# Patient Record
Sex: FEMALE | Race: WHITE | NOT HISPANIC OR LATINO | Employment: OTHER | URBAN - NONMETROPOLITAN AREA
[De-identification: names, ages, dates, MRNs, and addresses within clinical notes are randomized per-mention and may not be internally consistent; named-entity substitution may affect disease eponyms.]

---

## 2021-09-20 ENCOUNTER — TELEPHONE (OUTPATIENT)
Dept: CARDIOLOGY | Facility: CLINIC | Age: 61
End: 2021-09-20

## 2021-09-20 ENCOUNTER — OFFICE VISIT (OUTPATIENT)
Dept: CARDIOLOGY | Facility: CLINIC | Age: 61
End: 2021-09-20

## 2021-09-20 ENCOUNTER — LAB (OUTPATIENT)
Dept: CARDIOLOGY | Facility: CLINIC | Age: 61
End: 2021-09-20

## 2021-09-20 VITALS
HEART RATE: 80 BPM | TEMPERATURE: 97.4 F | WEIGHT: 154 LBS | DIASTOLIC BLOOD PRESSURE: 74 MMHG | OXYGEN SATURATION: 98 % | BODY MASS INDEX: 28.34 KG/M2 | HEIGHT: 62 IN | SYSTOLIC BLOOD PRESSURE: 124 MMHG

## 2021-09-20 DIAGNOSIS — R60.9 PERIPHERAL EDEMA: ICD-10-CM

## 2021-09-20 DIAGNOSIS — R26.89 IMBALANCE: ICD-10-CM

## 2021-09-20 DIAGNOSIS — R20.2 NUMBNESS AND TINGLING IN LEFT ARM: ICD-10-CM

## 2021-09-20 DIAGNOSIS — G45.8 SUBCLAVIAN STEAL SYNDROME OF LEFT SUBCLAVIAN ARTERY: ICD-10-CM

## 2021-09-20 DIAGNOSIS — R94.31 EKG ABNORMALITIES: Primary | ICD-10-CM

## 2021-09-20 DIAGNOSIS — R20.0 NUMBNESS AND TINGLING IN LEFT ARM: ICD-10-CM

## 2021-09-20 DIAGNOSIS — F17.200 SMOKING: ICD-10-CM

## 2021-09-20 DIAGNOSIS — R06.02 SHORTNESS OF BREATH: ICD-10-CM

## 2021-09-20 DIAGNOSIS — R09.89 BILATERAL CAROTID BRUITS: ICD-10-CM

## 2021-09-20 PROBLEM — F32.A ANXIETY AND DEPRESSION: Status: ACTIVE | Noted: 2021-09-20

## 2021-09-20 PROBLEM — F41.9 ANXIETY AND DEPRESSION: Status: ACTIVE | Noted: 2021-09-20

## 2021-09-20 PROBLEM — M54.2 NECK PAIN: Status: ACTIVE | Noted: 2021-09-20

## 2021-09-20 PROBLEM — J30.2 SEASONAL ALLERGIES: Status: ACTIVE | Noted: 2021-09-20

## 2021-09-20 PROBLEM — J44.9 COPD (CHRONIC OBSTRUCTIVE PULMONARY DISEASE): Status: ACTIVE | Noted: 2021-09-20

## 2021-09-20 LAB
ALBUMIN SERPL-MCNC: 4.08 G/DL (ref 3.5–5.2)
ALBUMIN/GLOB SERPL: 1.3 G/DL
ALP SERPL-CCNC: 103 U/L (ref 39–117)
ALT SERPL W P-5'-P-CCNC: 12 U/L (ref 1–33)
ANION GAP SERPL CALCULATED.3IONS-SCNC: 10.7 MMOL/L (ref 5–15)
AST SERPL-CCNC: 24 U/L (ref 1–32)
BASOPHILS # BLD AUTO: 0.1 10*3/MM3 (ref 0–0.2)
BASOPHILS NFR BLD AUTO: 1.1 % (ref 0–1.5)
BILIRUB SERPL-MCNC: 0.5 MG/DL (ref 0–1.2)
BUN SERPL-MCNC: 10 MG/DL (ref 8–23)
BUN/CREAT SERPL: 18.9 (ref 7–25)
CALCIUM SPEC-SCNC: 9.3 MG/DL (ref 8.6–10.5)
CHLORIDE SERPL-SCNC: 99 MMOL/L (ref 98–107)
CHOLEST SERPL-MCNC: 223 MG/DL (ref 0–200)
CO2 SERPL-SCNC: 25.3 MMOL/L (ref 22–29)
CREAT SERPL-MCNC: 0.53 MG/DL (ref 0.57–1)
DEPRECATED RDW RBC AUTO: 43.2 FL (ref 37–54)
EOSINOPHIL # BLD AUTO: 0.35 10*3/MM3 (ref 0–0.4)
EOSINOPHIL NFR BLD AUTO: 3.8 % (ref 0.3–6.2)
ERYTHROCYTE [DISTWIDTH] IN BLOOD BY AUTOMATED COUNT: 12.8 % (ref 12.3–15.4)
GFR SERPL CREATININE-BSD FRML MDRD: 117 ML/MIN/1.73
GLOBULIN UR ELPH-MCNC: 3.2 GM/DL
GLUCOSE SERPL-MCNC: 98 MG/DL (ref 65–99)
HCT VFR BLD AUTO: 43 % (ref 34–46.6)
HDLC SERPL-MCNC: 49 MG/DL (ref 40–60)
HGB BLD-MCNC: 13.9 G/DL (ref 12–15.9)
IMM GRANULOCYTES # BLD AUTO: 0.03 10*3/MM3 (ref 0–0.05)
IMM GRANULOCYTES NFR BLD AUTO: 0.3 % (ref 0–0.5)
LDLC SERPL CALC-MCNC: 150 MG/DL (ref 0–100)
LDLC/HDLC SERPL: 3.02 {RATIO}
LYMPHOCYTES # BLD AUTO: 3.54 10*3/MM3 (ref 0.7–3.1)
LYMPHOCYTES NFR BLD AUTO: 38.7 % (ref 19.6–45.3)
MCH RBC QN AUTO: 29.5 PG (ref 26.6–33)
MCHC RBC AUTO-ENTMCNC: 32.3 G/DL (ref 31.5–35.7)
MCV RBC AUTO: 91.3 FL (ref 79–97)
MONOCYTES # BLD AUTO: 0.61 10*3/MM3 (ref 0.1–0.9)
MONOCYTES NFR BLD AUTO: 6.7 % (ref 5–12)
NEUTROPHILS NFR BLD AUTO: 4.51 10*3/MM3 (ref 1.7–7)
NEUTROPHILS NFR BLD AUTO: 49.4 % (ref 42.7–76)
NRBC BLD AUTO-RTO: 0 /100 WBC (ref 0–0.2)
PLATELET # BLD AUTO: 283 10*3/MM3 (ref 140–450)
PMV BLD AUTO: 10.4 FL (ref 6–12)
POTASSIUM SERPL-SCNC: 3.9 MMOL/L (ref 3.5–5.2)
PROT SERPL-MCNC: 7.3 G/DL (ref 6–8.5)
RBC # BLD AUTO: 4.71 10*6/MM3 (ref 3.77–5.28)
SODIUM SERPL-SCNC: 135 MMOL/L (ref 136–145)
TRIGL SERPL-MCNC: 131 MG/DL (ref 0–150)
TSH SERPL DL<=0.05 MIU/L-ACNC: 1.25 UIU/ML (ref 0.27–4.2)
VLDLC SERPL-MCNC: 24 MG/DL (ref 5–40)
WBC # BLD AUTO: 9.14 10*3/MM3 (ref 3.4–10.8)

## 2021-09-20 PROCEDURE — 80050 GENERAL HEALTH PANEL: CPT | Performed by: NURSE PRACTITIONER

## 2021-09-20 PROCEDURE — 99204 OFFICE O/P NEW MOD 45 MIN: CPT | Performed by: NURSE PRACTITIONER

## 2021-09-20 PROCEDURE — 93000 ELECTROCARDIOGRAM COMPLETE: CPT | Performed by: NURSE PRACTITIONER

## 2021-09-20 PROCEDURE — 80061 LIPID PANEL: CPT | Performed by: NURSE PRACTITIONER

## 2021-09-20 PROCEDURE — 36415 COLL VENOUS BLD VENIPUNCTURE: CPT

## 2021-09-20 RX ORDER — ASPIRIN 81 MG/1
81 TABLET ORAL DAILY
Qty: 90 TABLET | Refills: 3 | Status: SHIPPED | OUTPATIENT
Start: 2021-09-20

## 2021-09-20 RX ORDER — ALENDRONATE SODIUM 70 MG/1
70 TABLET ORAL
COMMUNITY

## 2021-09-20 RX ORDER — RALOXIFENE HYDROCHLORIDE 60 MG/1
60 TABLET, FILM COATED ORAL DAILY
COMMUNITY
End: 2021-09-20

## 2021-09-20 RX ORDER — UBIDECARENONE 75 MG
50 CAPSULE ORAL DAILY
COMMUNITY

## 2021-09-20 NOTE — PROGRESS NOTES
Subjective   Blaire Mena is a 60 y.o. female     Chief Complaint   Patient presents with   [facility]    Problem List:    1.  Subclavian steal syndrome left subclavian artery  2.  Bilateral carotid bruits  3.  Shortness of breath  4.  COPD  5.  Neck pain  6.  Anxiety and depression  7.  Smoking habituation    Patient is a 60-year-old female who presents today for cardiac clearance for neck surgery.  Patient says she has imbalance and she also has numbness in her left arm and hand.  She said that she had a nerve conduction study test that was positive for nerve damage coming from her neck.  She denies any chest pain, pressure, palpitations, fluttering, dizziness, presyncope, syncope, orthopnea or PND.  Patient says that again she has numbness in her left arm.  She will get weakness in her left leg.  She says she does get swelling in her ankles when she is on them more, but goes down overnight. She says she has shortness of breath with increased activity however due to her imbalance and her weakness she has not really been very active at all. She says her family does a lot for her. She did see a cardiologist in [place] in the past, Dr. Ramírez. We will request those records. She says she saw him before because of swelling.    Occupation: Disabled due to left lower extremity injury requiring surgery and plates  Patient smokes 2 pack a day for 40+ years; no alcohol or illicit drugs  She denies soda; drinks 3 cups of coffee per day; occasional glass of tea    Mom 83 -CHF, diabetes  Dad -brain embolism, heart problems, smoker  Brother late 60s alive-hypertension  Sister 71 alive-hypertension    Patient had 27 mmHg difference between left and right arm in blood pressure readings today. Patient also had carotid bruits and some nonspecific EKG changes. Patient is going to definitely need a work-up prior to us clearing her for surgery on Monday. I have requested her testing to be done as  soon as possible.    Current Outpatient Medications on File Prior to Visit   Medication Sig Dispense Refill   • alendronate (FOSAMAX) 70 MG tablet Take 70 mg by mouth Every 7 (Seven) Days.     • vitamin B-12 (CYANOCOBALAMIN) 100 MCG tablet Take 50 mcg by mouth Daily.     • [DISCONTINUED] raloxifene (EVISTA) 60 MG tablet Take 60 mg by mouth Daily.       No current facility-administered medications on file prior to visit.       ALLERGIES    Sulfa antibiotics    Past Medical History:   Diagnosis Date   • Arthritis    • Asthma    • COPD (chronic obstructive pulmonary disease) (CMS/[facility])        Social History     Socioeconomic History   • Marital status:      Spouse name: Not on file   • Number of children: Not on file   • Years of education: Not on file   • Highest education level: Not on file   Tobacco Use   • Smoking status: Current Every Day Smoker     Packs/day: 2.50     Years: 41.00     Pack years: 102.50     Types: Cigarettes   • Smokeless tobacco: Never Used   Substance and Sexual Activity   • Alcohol use: Never   • Drug use: Defer   • Sexual activity: Defer       Family History   Problem Relation Age of Onset   • Diabetes Mother    • Heart disease Mother    • Hypertension Mother    • Hyperlipidemia Mother    • Hypertension Father    • Heart disease Father    • Hyperlipidemia Father    • Aneurysm Father        Review of Systems   Constitutional: Negative for appetite change, chills, diaphoresis, fatigue and fever.   HENT: Positive for congestion (chest ). Negative for rhinorrhea and sore throat.    Eyes: Positive for visual disturbance (reading glasses).   Respiratory: Positive for cough (Clear and yellow phlem ) and shortness of breath (right now due to bronchitis otherwise, limited due to physical problems ). Negative for chest tightness and wheezing.    Cardiovascular: Positive for leg swelling (ankle ( left) the swelling goes down at night; when she is on them more ). Negative for chest pain and  "palpitations.   Gastrointestinal: Positive for diarrhea (due to taking meds for infection ). Negative for abdominal pain, blood in stool, constipation, nausea and vomiting.   Endocrine: Negative for cold intolerance and heat intolerance.   Genitourinary: Negative for difficulty urinating, dysuria, frequency, hematuria and urgency.   Musculoskeletal: Positive for arthralgias (left leg ), gait problem (due to her back and leg), neck pain (back of her neck ) and neck stiffness (stiffiness turning to the left ). Negative for back pain and joint swelling.   Skin: Negative for color change, pallor, rash and wound.   Allergic/Immunologic: Negative for environmental allergies and food allergies.   Neurological: Positive for weakness (left leg ), numbness (arms and hands ( left hand is the worse)  ) and headaches (due toneck pain ). Negative for dizziness and light-headedness.   Psychiatric/Behavioral: Negative for sleep disturbance.       Objective   /74 (BP Location: Right arm, Patient Position: Sitting)   Pulse 80   Temp 97.4 °F (36.3 °C)   Ht 157.5 cm (62\")   Wt 69.9 kg (154 lb)   SpO2 98%   BMI 28.17 kg/m²   Vitals:    09/20/21 1606 09/20/21 1655 09/20/21 1656   BP: 145/75 97/68 124/74   BP Location: Left arm Left arm Right arm   Patient Position:  Sitting Sitting   Pulse: 80     Temp: 97.4 °F (36.3 °C)     SpO2: 98%     Weight: 69.9 kg (154 lb)     Height: 157.5 cm (62\")        Lab Results (most recent)     None        Physical Exam  Vitals reviewed.   Constitutional:       General: She is awake.      Appearance: Normal appearance. She is well-developed, well-groomed and overweight.   HENT:      Head: Normocephalic.   Eyes:      General: Lids are normal.      Comments: Wears glasses    Neck:      Vascular: Carotid bruit (bilateral ) present.   Cardiovascular:      Rate and Rhythm: Normal rate and regular rhythm.      Pulses:           Radial pulses are 2+ on the right side.        Dorsalis pedis pulses are " 2+ on the right side and 2+ on the left side.        Posterior tibial pulses are 2+ on the right side and 2+ on the left side.      Heart sounds: Normal heart sounds.      Comments: Decreased pulse L radial artery   Pulmonary:      Effort: Pulmonary effort is normal.      Breath sounds: Normal air entry. Examination of the right-lower field reveals decreased breath sounds. Examination of the left-lower field reveals decreased breath sounds. Decreased breath sounds present.   Abdominal:      General: Bowel sounds are normal.      Palpations: Abdomen is soft.   Musculoskeletal:      Cervical back: Signs of trauma present.      Right lower leg: No edema.      Left lower leg: No edema.   Skin:     General: Skin is warm and dry.   Neurological:      Mental Status: She is alert and oriented to person, place, and time.   Psychiatric:         Attention and Perception: Attention and perception normal.         Mood and Affect: Mood and affect normal.         Speech: Speech normal.         Behavior: Behavior normal. Behavior is cooperative.         Thought Content: Thought content normal.         Cognition and Memory: Cognition and memory normal.         Judgment: Judgment normal.         Procedure     ECG 12 Lead    Date/Time: 9/20/2021 5:43 PM  Performed by: Yi Dela Cruz APRN  Authorized by: Yi Dela Cruz APRN   Comparison: not compared with previous ECG   Rhythm: sinus rhythm  Rate: normal  BPM: 73  T inversion: III and aVF  QRS axis: normal  Other findings: low voltage and T wave abnormality    Clinical impression: abnormal EKG                 Assessment/Plan      Diagnosis Plan   1. EKG abnormalities  Stress Test With Myocardial Perfusion One Day   2. Peripheral edema  Adult Transthoracic Echo Complete W/ Cont if Necessary Per Protocol   3. Subclavian steal syndrome of left subclavian artery  US Carotid Bilateral   4. Numbness and tingling in left arm  US Carotid Bilateral   5. Smoking  Adult Transthoracic Echo  Complete W/ Cont if Necessary Per Protocol    Stress Test With Myocardial Perfusion One Day   6. Shortness of breath  Adult Transthoracic Echo Complete W/ Cont if Necessary Per Protocol    Stress Test With Myocardial Perfusion One Day   7. Bilateral carotid bruits  US Carotid Bilateral    aspirin (aspirin) 81 MG EC tablet    Comprehensive Metabolic Panel    Lipid Panel    CBC & Differential    TSH   8. Imbalance         Return in about 12 weeks (around 12/13/2021).    EKG abnormalities/peripheral edema/smoking/shortness of breath-patient have an ischemia work-up, stress and echo. Patient is unable to walk on the treadmill due to her imbalance as well as her left lower extremity weakness. She has had multiple surgeries on that leg. She will start aspirin 81. I am getting blood work today and then we will try to get her on cholesterol medicine. She will continue her medication regimen otherwise. Subclavian steal syndrome left subclavian artery/numbness and tingling left arm/bilateral carotid bruits-patient have carotid artery ultrasound. She will get CMP, lipid, CBC and TSH today. She will follow-up in 12 weeks or sooner if any changes or abnormalities with testing.       Blaire Mena  reports that she has been smoking cigarettes. She has a 102.50 pack-year smoking history. She has never used smokeless tobacco.. Patient did not bring med list or medicine bottles to appointment, med list has been reviewed and updated based on patient's knowledge of their meds.   Electronically signed by:

## 2021-09-20 NOTE — TELEPHONE ENCOUNTER
CARDIAC CLEARANCE RECEIVED THIS DATE, SCANNED, AND PLACED IN CLINICAL STAFF'S MAILBOX.    [facility] NEURO  DR. TAL EASTMAN 34,4-5,5-6  09/27/2021

## 2021-09-21 ENCOUNTER — TELEPHONE (OUTPATIENT)
Dept: CARDIOLOGY | Facility: CLINIC | Age: 61
End: 2021-09-21

## 2021-09-21 DIAGNOSIS — R60.9 PERIPHERAL EDEMA: ICD-10-CM

## 2021-09-21 DIAGNOSIS — R20.0 NUMBNESS AND TINGLING IN LEFT ARM: ICD-10-CM

## 2021-09-21 DIAGNOSIS — F17.200 SMOKING: ICD-10-CM

## 2021-09-21 DIAGNOSIS — M54.2 NECK PAIN: ICD-10-CM

## 2021-09-21 DIAGNOSIS — R94.31 EKG ABNORMALITIES: ICD-10-CM

## 2021-09-21 DIAGNOSIS — R20.2 NUMBNESS AND TINGLING IN LEFT ARM: ICD-10-CM

## 2021-09-21 DIAGNOSIS — R09.89 BILATERAL CAROTID BRUITS: Primary | ICD-10-CM

## 2021-09-21 DIAGNOSIS — R06.02 SHORTNESS OF BREATH: ICD-10-CM

## 2021-09-21 DIAGNOSIS — J44.9 CHRONIC OBSTRUCTIVE PULMONARY DISEASE, UNSPECIFIED COPD TYPE (HCC): ICD-10-CM

## 2021-09-21 RX ORDER — ATORVASTATIN CALCIUM 40 MG/1
40 TABLET, FILM COATED ORAL NIGHTLY
Qty: 30 TABLET | Refills: 11 | Status: SHIPPED | OUTPATIENT
Start: 2021-09-21 | End: 2022-02-11 | Stop reason: ALTCHOICE

## 2021-09-21 NOTE — TELEPHONE ENCOUNTER
----- Message from SHARYN Shoemaker sent at 9/21/2021  6:15 AM EDT -----  Please advise patient.  Want her to start statin.  LDL is 150.  Have her start atorvastatin 40 mg PO nightly and repeat cmp/lipids in 6 weeks.           Pt was advised of lab results and she will repeat labs in 6 weeks ,pt will  lab orders tomorrow while she is here for testing .          TSH   0.270 - 4.200 uIU/mL 1.250      Creatinine   0.57 - 1.00 mg/dL 0.53Low     Sodium   136 - 145 mmol/L 135Low     Potassium   3.5 - 5.2 mmol/L 3.9      ALT (SGPT)   1 - 33 U/L 12    AST (SGOT)   1 - 32 U/L 24      Total Cholesterol   0 - 200 mg/dL 223High     Triglycerides   0 - 150 mg/dL 131    HDL Cholesterol   40 - 60 mg/dL 49    LDL Cholesterol    0 - 100 mg/dL 150High

## 2021-09-22 ENCOUNTER — HOSPITAL ENCOUNTER (OUTPATIENT)
Dept: CARDIOLOGY | Facility: HOSPITAL | Age: 61
Discharge: HOME | End: 2021-09-22

## 2021-09-22 VITALS — HEIGHT: 62 IN | WEIGHT: 154.1 LBS | BODY MASS INDEX: 28.36 KG/M2

## 2021-09-22 DIAGNOSIS — R20.2 NUMBNESS AND TINGLING IN LEFT ARM: ICD-10-CM

## 2021-09-22 DIAGNOSIS — R09.89 BILATERAL CAROTID BRUITS: ICD-10-CM

## 2021-09-22 DIAGNOSIS — R60.9 PERIPHERAL EDEMA: ICD-10-CM

## 2021-09-22 DIAGNOSIS — G45.8 SUBCLAVIAN STEAL SYNDROME OF LEFT SUBCLAVIAN ARTERY: ICD-10-CM

## 2021-09-22 DIAGNOSIS — R94.31 EKG ABNORMALITIES: ICD-10-CM

## 2021-09-22 DIAGNOSIS — R20.0 NUMBNESS AND TINGLING IN LEFT ARM: ICD-10-CM

## 2021-09-22 DIAGNOSIS — F17.200 SMOKING: ICD-10-CM

## 2021-09-22 DIAGNOSIS — R06.02 SHORTNESS OF BREATH: ICD-10-CM

## 2021-09-22 PROCEDURE — A9500 TC99M SESTAMIBI: HCPCS | Performed by: INTERNAL MEDICINE

## 2021-09-22 PROCEDURE — 93880 EXTRACRANIAL BILAT STUDY: CPT | Performed by: INTERNAL MEDICINE

## 2021-09-22 PROCEDURE — 93017 CV STRESS TEST TRACING ONLY: CPT

## 2021-09-22 PROCEDURE — 93306 TTE W/DOPPLER COMPLETE: CPT | Performed by: SPECIALIST

## 2021-09-22 PROCEDURE — 0 TECHNETIUM SESTAMIBI: Performed by: INTERNAL MEDICINE

## 2021-09-22 PROCEDURE — 78452 HT MUSCLE IMAGE SPECT MULT: CPT | Performed by: SPECIALIST

## 2021-09-22 PROCEDURE — 93880 EXTRACRANIAL BILAT STUDY: CPT

## 2021-09-22 PROCEDURE — 93018 CV STRESS TEST I&R ONLY: CPT | Performed by: SPECIALIST

## 2021-09-22 PROCEDURE — 25010000002 REGADENOSON 0.4 MG/5ML SOLUTION: Performed by: INTERNAL MEDICINE

## 2021-09-22 PROCEDURE — 78452 HT MUSCLE IMAGE SPECT MULT: CPT

## 2021-09-22 PROCEDURE — 93306 TTE W/DOPPLER COMPLETE: CPT

## 2021-09-22 RX ADMIN — TECHNETIUM TC 99M SESTAMIBI 1 DOSE: 1 INJECTION INTRAVENOUS at 11:01

## 2021-09-22 RX ADMIN — TECHNETIUM TC 99M SESTAMIBI 1 DOSE: 1 INJECTION INTRAVENOUS at 12:38

## 2021-09-22 RX ADMIN — REGADENOSON 0.4 MG: 0.08 INJECTION, SOLUTION INTRAVENOUS at 12:39

## 2021-09-24 ENCOUNTER — TELEPHONE (OUTPATIENT)
Dept: CARDIOLOGY | Facility: CLINIC | Age: 61
End: 2021-09-24

## 2021-09-24 LAB
AORTIC DIMENSIONLESS INDEX: 0.7 (DI)
BH CV ECHO MEAS - ACS: 2 CM
BH CV ECHO MEAS - AI DEC SLOPE: 222 CM/SEC^2
BH CV ECHO MEAS - AI MAX PG: 107.3 MMHG
BH CV ECHO MEAS - AI MAX VEL: 518 CM/SEC
BH CV ECHO MEAS - AI P1/2T: 683.4 MSEC
BH CV ECHO MEAS - AO MAX PG (FULL): 3.3 MMHG
BH CV ECHO MEAS - AO MAX PG: 6.5 MMHG
BH CV ECHO MEAS - AO MEAN PG (FULL): 3 MMHG
BH CV ECHO MEAS - AO MEAN PG: 4 MMHG
BH CV ECHO MEAS - AO ROOT AREA (BSA CORRECTED): 1.9
BH CV ECHO MEAS - AO ROOT AREA: 8.6 CM^2
BH CV ECHO MEAS - AO ROOT DIAM: 3.3 CM
BH CV ECHO MEAS - AO V2 MAX: 127 CM/SEC
BH CV ECHO MEAS - AO V2 MEAN: 90.2 CM/SEC
BH CV ECHO MEAS - AO V2 VTI: 30 CM
BH CV ECHO MEAS - BSA(HAYCOCK): 1.8 M^2
BH CV ECHO MEAS - BSA: 1.7 M^2
BH CV ECHO MEAS - BZI_BMI: 28.2 KILOGRAMS/M^2
BH CV ECHO MEAS - BZI_METRIC_HEIGHT: 157.5 CM
BH CV ECHO MEAS - BZI_METRIC_WEIGHT: 69.9 KG
BH CV ECHO MEAS - EDV(CUBED): 75.2 ML
BH CV ECHO MEAS - EDV(TEICH): 79.5 ML
BH CV ECHO MEAS - EF(CUBED): 71.4 %
BH CV ECHO MEAS - EF(MOD-BP): 63 %
BH CV ECHO MEAS - EF(TEICH): 63.5 %
BH CV ECHO MEAS - EF_3D-VOL: 56 %
BH CV ECHO MEAS - ESV(CUBED): 21.5 ML
BH CV ECHO MEAS - ESV(TEICH): 29 ML
BH CV ECHO MEAS - FS: 34.1 %
BH CV ECHO MEAS - IVS/LVPW: 1.2
BH CV ECHO MEAS - IVSD: 1.4 CM
BH CV ECHO MEAS - LA DIMENSION: 3.2 CM
BH CV ECHO MEAS - LA/AO: 0.95
BH CV ECHO MEAS - LAT PEAK E' VEL: 7.3 CM/SEC
BH CV ECHO MEAS - LV IVRT: 0.13 SEC
BH CV ECHO MEAS - LV MASS(C)D: 185 GRAMS
BH CV ECHO MEAS - LV MASS(C)DI: 108.1 GRAMS/M^2
BH CV ECHO MEAS - LV MAX PG: 3.2 MMHG
BH CV ECHO MEAS - LV MEAN PG: 1 MMHG
BH CV ECHO MEAS - LV V1 MAX: 89.4 CM/SEC
BH CV ECHO MEAS - LV V1 MEAN: 56.1 CM/SEC
BH CV ECHO MEAS - LV V1 VTI: 23.8 CM
BH CV ECHO MEAS - LVIDD: 4.2 CM
BH CV ECHO MEAS - LVIDS: 2.8 CM
BH CV ECHO MEAS - LVPWD: 1.1 CM
BH CV ECHO MEAS - MED PEAK E' VEL: 5.8 CM/SEC
BH CV ECHO MEAS - MR MAX PG: 94.1 MMHG
BH CV ECHO MEAS - MR MAX VEL: 485 CM/SEC
BH CV ECHO MEAS - MV A MAX VEL: 63.1 CM/SEC
BH CV ECHO MEAS - MV DEC SLOPE: 374 CM/SEC^2
BH CV ECHO MEAS - MV DEC TIME: 0.32 SEC
BH CV ECHO MEAS - MV E MAX VEL: 44.2 CM/SEC
BH CV ECHO MEAS - MV E/A: 0.7
BH CV ECHO MEAS - MV MAX PG: 2.6 MMHG
BH CV ECHO MEAS - MV MEAN PG: 1 MMHG
BH CV ECHO MEAS - MV P1/2T MAX VEL: 91.8 CM/SEC
BH CV ECHO MEAS - MV P1/2T: 71.9 MSEC
BH CV ECHO MEAS - MV V2 MAX: 80.3 CM/SEC
BH CV ECHO MEAS - MV V2 MEAN: 48.1 CM/SEC
BH CV ECHO MEAS - MV V2 VTI: 24.1 CM
BH CV ECHO MEAS - MVA P1/2T LCG: 2.4 CM^2
BH CV ECHO MEAS - MVA(P1/2T): 3.1 CM^2
BH CV ECHO MEAS - PA MAX PG: 2.6 MMHG
BH CV ECHO MEAS - PA MEAN PG: 2 MMHG
BH CV ECHO MEAS - PA V2 MAX: 80.3 CM/SEC
BH CV ECHO MEAS - PA V2 MEAN: 58.1 CM/SEC
BH CV ECHO MEAS - PA V2 VTI: 17.8 CM
BH CV ECHO MEAS - RAP SYSTOLE: 10 MMHG
BH CV ECHO MEAS - RVDD: 3.3 CM
BH CV ECHO MEAS - RVSP: 15.2 MMHG
BH CV ECHO MEAS - SI(AO): 150 ML/M^2
BH CV ECHO MEAS - SI(CUBED): 31.4 ML/M^2
BH CV ECHO MEAS - SI(TEICH): 29.5 ML/M^2
BH CV ECHO MEAS - SV(AO): 256.6 ML
BH CV ECHO MEAS - SV(CUBED): 53.7 ML
BH CV ECHO MEAS - SV(TEICH): 50.4 ML
BH CV ECHO MEAS - TR MAX VEL: 114 CM/SEC
BH CV ECHO MEASUREMENTS AVERAGE E/E' RATIO: 6.75
BH CV REST NUCLEAR ISOTOPE DOSE: 10 MCI
BH CV STRESS COMMENTS STAGE 1: NORMAL
BH CV STRESS DOSE REGADENOSON STAGE 1: 0.4
BH CV STRESS DURATION MIN STAGE 1: 0
BH CV STRESS DURATION SEC STAGE 1: 10
BH CV STRESS NUCLEAR ISOTOPE DOSE: 30 MCI
BH CV STRESS PROTOCOL 1: NORMAL
BH CV STRESS RECOVERY BP: NORMAL MMHG
BH CV STRESS RECOVERY HR: 70 BPM
BH CV STRESS STAGE 1: 1
LV EF NUC BP: 57 %
MAXIMAL PREDICTED HEART RATE: 158 BPM
MAXIMAL PREDICTED HEART RATE: 158 BPM
PERCENT MAX PREDICTED HR: 57.59 %
SINUS: 3.7 CM
STJ: 2.9 CM
STRESS BASELINE BP: NORMAL MMHG
STRESS BASELINE HR: 63 BPM
STRESS PERCENT HR: 68 %
STRESS POST PEAK BP: NORMAL MMHG
STRESS POST PEAK HR: 91 BPM
STRESS TARGET HR: 134 BPM
STRESS TARGET HR: 134 BPM

## 2021-09-24 NOTE — TELEPHONE ENCOUNTER
----- Message from SHARYN Shoemaker sent at 9/24/2021  9:43 AM EDT -----  Please advise patient.         Pt was advised of test results and that her Clearance was faxed     Echo -ejection fraction appears to be 61 - 65%    Stress test - Findings consistent with a normal ECG stress test.

## 2021-09-26 LAB
BH CV ECHO MEAS - BSA(HAYCOCK): 1.8 M^2
BH CV ECHO MEAS - BSA: 1.7 M^2
BH CV ECHO MEAS - BZI_BMI: 29.1 KILOGRAMS/M^2
BH CV ECHO MEAS - BZI_METRIC_HEIGHT: 154.9 CM
BH CV ECHO MEAS - BZI_METRIC_WEIGHT: 69.9 KG
BH CV XLRA MEAS LEFT BULB EDV: -21.6 CM/SEC
BH CV XLRA MEAS LEFT BULB PSV: -62.9 CM/SEC
BH CV XLRA MEAS LEFT CCA RATIO VEL: -79.6 CM/SEC
BH CV XLRA MEAS LEFT DIST CCA EDV: -16.1 CM/SEC
BH CV XLRA MEAS LEFT DIST CCA PSV: -80.3 CM/SEC
BH CV XLRA MEAS LEFT DIST ICA EDV: -30.8 CM/SEC
BH CV XLRA MEAS LEFT DIST ICA PSV: -99.3 CM/SEC
BH CV XLRA MEAS LEFT ICA RATIO VEL: -98.7 CM/SEC
BH CV XLRA MEAS LEFT ICA/CCA RATIO: 1.2
BH CV XLRA MEAS LEFT MID ICA EDV: -24.9 CM/SEC
BH CV XLRA MEAS LEFT MID ICA PSV: -77.7 CM/SEC
BH CV XLRA MEAS LEFT PROX CCA EDV: 28.9 CM/SEC
BH CV XLRA MEAS LEFT PROX CCA PSV: 113.1 CM/SEC
BH CV XLRA MEAS LEFT PROX ECA EDV: -18.2 CM/SEC
BH CV XLRA MEAS LEFT PROX ECA PSV: -100.6 CM/SEC
BH CV XLRA MEAS LEFT PROX ICA EDV: -22.3 CM/SEC
BH CV XLRA MEAS LEFT PROX ICA PSV: -82.4 CM/SEC
BH CV XLRA MEAS LEFT PROX SCLA EDV: 8.8 CM/SEC
BH CV XLRA MEAS LEFT PROX SCLA PSV: 79.1 CM/SEC
BH CV XLRA MEAS LEFT VERTEBRAL A EDV: 7.9 CM/SEC
BH CV XLRA MEAS LEFT VERTEBRAL A PSV: 21.1 CM/SEC
BH CV XLRA MEAS RIGHT BULB EDV: 2.2 CM/SEC
BH CV XLRA MEAS RIGHT BULB PSV: 39.2 CM/SEC
BH CV XLRA MEAS RIGHT CCA RATIO VEL: 71.1 CM/SEC
BH CV XLRA MEAS RIGHT DIST CCA EDV: 17.1 CM/SEC
BH CV XLRA MEAS RIGHT DIST CCA PSV: 71.7 CM/SEC
BH CV XLRA MEAS RIGHT DIST ICA EDV: -24.5 CM/SEC
BH CV XLRA MEAS RIGHT DIST ICA PSV: -71.9 CM/SEC
BH CV XLRA MEAS RIGHT ICA RATIO VEL: 81.2 CM/SEC
BH CV XLRA MEAS RIGHT ICA/CCA RATIO: 1.1
BH CV XLRA MEAS RIGHT MID ICA EDV: 24 CM/SEC
BH CV XLRA MEAS RIGHT MID ICA PSV: 81.6 CM/SEC
BH CV XLRA MEAS RIGHT PROX CCA EDV: -18.3 CM/SEC
BH CV XLRA MEAS RIGHT PROX CCA PSV: -148.4 CM/SEC
BH CV XLRA MEAS RIGHT PROX ECA EDV: -17.1 CM/SEC
BH CV XLRA MEAS RIGHT PROX ECA PSV: -95.9 CM/SEC
BH CV XLRA MEAS RIGHT PROX ICA EDV: -18.8 CM/SEC
BH CV XLRA MEAS RIGHT PROX ICA PSV: -63.3 CM/SEC
BH CV XLRA MEAS RIGHT PROX SCLA EDV: 1.7 CM/SEC
BH CV XLRA MEAS RIGHT PROX SCLA PSV: 115.2 CM/SEC
BH CV XLRA MEAS RIGHT VERTEBRAL A EDV: 27.7 CM/SEC
BH CV XLRA MEAS RIGHT VERTEBRAL A PSV: 72.3 CM/SEC
MAXIMAL PREDICTED HEART RATE: 158 BPM
STRESS TARGET HR: 134 BPM

## 2021-09-27 ENCOUNTER — TELEPHONE (OUTPATIENT)
Dept: CARDIOLOGY | Facility: CLINIC | Age: 61
End: 2021-09-27

## 2021-09-27 DIAGNOSIS — R93.89 ABNORMAL ULTRASOUND OF CAROTID ARTERY: ICD-10-CM

## 2021-09-27 DIAGNOSIS — R09.89 BILATERAL CAROTID BRUITS: ICD-10-CM

## 2021-09-27 DIAGNOSIS — F17.200 SMOKING: ICD-10-CM

## 2021-09-27 DIAGNOSIS — R06.02 SHORTNESS OF BREATH: ICD-10-CM

## 2021-09-27 DIAGNOSIS — M54.2 NECK PAIN: ICD-10-CM

## 2021-09-27 DIAGNOSIS — G45.8 SUBCLAVIAN STEAL SYNDROME OF LEFT SUBCLAVIAN ARTERY: Primary | ICD-10-CM

## 2021-09-27 NOTE — PROGRESS NOTES
Cta of neck, diag subclavian stenosis, vertebral artery stenosis and abnormal carotid artery US, will need a BMP order as well.

## 2021-09-27 NOTE — TELEPHONE ENCOUNTER
----- Message from SHARYN Shoemaker sent at 9/27/2021  7:04 AM EDT -----  Cta of neck, diag subclavian stenosis, vertebral artery stenosis and abnormal carotid artery US, will need a BMP order as well.              Left mess for pt regarding the above mess . PT will need to know results and to see if she is willing to do the CTA of the Neck .     Carotid U/S Results :    16 to 49% stenosis by Doppler in both internal carotid arteries.     4.  Antegrade flow in the right vertebral artery.  There is minimal flow in the left vertebral artery.  There also appears to be reversal of flow in the left subclavian.    CTA of the Neck ordered and Bmp ordered       AZUCENA Jones

## 2021-09-28 NOTE — TELEPHONE ENCOUNTER
Spoke with pt regarding results of us, per message.  ----- Message from HSARYN Shoemaker sent at 9/27/2021  7:04 AM EDT -----  Cta of neck, diag subclavian stenosis, vertebral artery stenosis and abnormal carotid artery US, will need a BMP order as well.                   Left mess for pt regarding the above mess . PT will need to know results and to see if she is willing to do the CTA of the Neck .      Carotid U/S Results :     16 to 49% stenosis by Doppler in both internal carotid arteries.     4.  Antegrade flow in the right vertebral artery.  There is minimal flow in the left vertebral artery.  There also appears to be reversal of flow in the left subclavian.     CTA of the Neck ordered and Bmp ordered         AZUCENA Jones                 Pt is okay with doing cta, and verbally understood she would be notified with blue and bmp is ordered before test, and she also understands.

## 2021-09-29 ENCOUNTER — TELEPHONE (OUTPATIENT)
Dept: CARDIOLOGY | Facility: CLINIC | Age: 61
End: 2021-09-29

## 2021-09-29 DIAGNOSIS — R06.02 SHORTNESS OF BREATH: Primary | ICD-10-CM

## 2021-09-29 DIAGNOSIS — F17.200 SMOKING: ICD-10-CM

## 2021-09-29 DIAGNOSIS — R09.89 BILATERAL CAROTID BRUITS: ICD-10-CM

## 2021-09-29 DIAGNOSIS — M54.2 NECK PAIN: ICD-10-CM

## 2021-09-29 DIAGNOSIS — G45.8 SUBCLAVIAN STEAL SYNDROME OF LEFT SUBCLAVIAN ARTERY: ICD-10-CM

## 2021-09-29 NOTE — TELEPHONE ENCOUNTER
----- Message from SHARYN Shoemaker sent at 9/27/2021  7:04 AM EDT -----  Cta of neck, diag subclavian stenosis, vertebral artery stenosis and abnormal carotid artery US, will need a BMP order as well.           Pt was advised of test results and that we would make her apt for CTA of the Neck . Pt was advised that someone would contact them with apt date and time .     Carotid U/S results :  16 to 49% stenosis         AZUCENA Jones

## 2021-10-22 ENCOUNTER — TELEPHONE (OUTPATIENT)
Dept: CARDIOLOGY | Facility: CLINIC | Age: 61
End: 2021-10-22

## 2021-10-22 DIAGNOSIS — I77.1 STENOSIS OF LEFT SUBCLAVIAN ARTERY (HCC): ICD-10-CM

## 2021-10-22 DIAGNOSIS — I65.02 STENOSIS OF LEFT VERTEBRAL ARTERY: Primary | ICD-10-CM

## 2021-10-22 NOTE — TELEPHONE ENCOUNTER
----- Message from Lakisha Natarajan LPN sent at 10/22/2021  8:46 AM EDT -----  Regarding: FW:    ----- Message -----  From: Yi Dela Cruz APRN  Sent: 10/21/2021   9:32 AM EDT  To: Lakisha Natarajan LPN  Subject: FW:                                              Left vertebral artery stenosis refer to Dr Ruiz.     Left subclavian artery stenosis refer to Dr Leiva.     She will need to get two copies of CT on CD to take with her to the appts.      Please confirm she is on ASA or plavix and a statin.  Thanks!  ----- Message -----  From: Ruth Mi, Valdez Rep  Sent: 10/21/2021   9:20 AM EDT  To: SHARYN Shoemaker

## 2021-10-22 NOTE — TELEPHONE ENCOUNTER
Entered orders and pended them regd pt's CTA neck results.       First attempt to reach pt. Left a voicemail for pt to return my call at [phone].

## 2021-10-25 NOTE — TELEPHONE ENCOUNTER
Pt called and was transferred to me. I informed her of her results and the message from Yi, she stated that she is still taking her medicines. She stated she wanted more info on the narrowing. I explained that her CTA neck showed narrowing in two arteries and that we need two different specialists to meet w/ her to discuss options. I advised her to P/U two copies of her CTa neck on a disc from [facility] for her appts and that both offices will call w/ appt dates and times for her. She verbalized understanding and understands to call w/ any questions.

## 2021-11-01 ENCOUNTER — OFFICE VISIT (OUTPATIENT)
Dept: CARDIAC SURGERY | Facility: CLINIC | Age: 61
End: 2021-11-01

## 2021-11-01 VITALS
HEART RATE: 88 BPM | OXYGEN SATURATION: 98 % | HEIGHT: 62 IN | DIASTOLIC BLOOD PRESSURE: 80 MMHG | SYSTOLIC BLOOD PRESSURE: 137 MMHG | BODY MASS INDEX: 28.71 KG/M2 | TEMPERATURE: 97.7 F | WEIGHT: 156 LBS

## 2021-11-01 DIAGNOSIS — I70.8 LEFT SUBCLAVIAN ARTERY OCCLUSION: Primary | ICD-10-CM

## 2021-11-01 PROCEDURE — 99204 OFFICE O/P NEW MOD 45 MIN: CPT | Performed by: THORACIC SURGERY (CARDIOTHORACIC VASCULAR SURGERY)

## 2021-11-01 NOTE — PROGRESS NOTES
11/01/2021  Patient Information  Blaire Mena                                                                                          [address] [facility] [address]   1960  'PCP/Referring Physician'  Sylvain Lujan MD  [phone]  Yi Dela Cruz APRN  [phone]  Chief Complaint   Patient presents with   • Consult     NP per SHARYN More for subclavian artery stenosis.       History of Present Illness:  This patient is a 61-year-old female who was referred to me to evaluate left arm numbness.  She states that for the last 3 to 4 months she has had weakness in the left arm and significant numbness in the fingers.  Subsequently, a CT angiogram demonstrated an occluded left subclavian artery.  In the office today we were unable to obtain an adequate blood pressure in the left arm with a noninvasive blood pressure cuff.  She does have chronic obstructive pulmonary disease and has ongoing tobacco abuse.  She denies any anginal symptoms and was evaluated by her cardiologist who referred her to me for the left subclavian occlusion.      Patient Active Problem List   Diagnosis   • Peripheral edema   • COPD (chronic obstructive pulmonary disease) ([facility])   • Anxiety and depression   • Neck pain   • Seasonal allergies   • Subclavian steal syndrome of left subclavian artery   • Numbness and tingling in left arm   • Smoking   • Shortness of breath   • EKG abnormalities   • Bilateral carotid bruits   • Imbalance   • Left subclavian artery occlusion     Past Medical History:   Diagnosis Date   • Arthritis    • Asthma    • COPD (chronic obstructive pulmonary disease) ([facility])    • Subclavian artery stenosis ([facility])      Past Surgical History:   Procedure Laterality Date   • APPENDECTOMY     • FEMUR FRACTURE SURGERY     • FIBULA FRACTURE SURGERY     • HYSTERECTOMY     • KNEE SURGERY Bilateral    • TIBIA FRACTURE SURGERY         Current Outpatient Medications:   •  aspirin (aspirin) 81 MG EC tablet, Take 1 tablet  by mouth Daily., Disp: 90 tablet, Rfl: 3  •  atorvastatin (LIPITOR) 40 MG tablet, Take 1 tablet by mouth Every Night., Disp: 30 tablet, Rfl: 11  •  vitamin B-12 (CYANOCOBALAMIN) 100 MCG tablet, Take 50 mcg by mouth Daily., Disp: , Rfl:   •  alendronate (FOSAMAX) 70 MG tablet, Take 70 mg by mouth Every 7 (Seven) Days., Disp: , Rfl:   Allergies   Allergen Reactions   • Sulfa Antibiotics Swelling     Social History     Socioeconomic History   • Marital status:    • Number of children: 3   Tobacco Use   • Smoking status: Current Every Day Smoker     Packs/day: 2.00     Years: 41.00     Pack years: 82.00     Types: Cigarettes   • Smokeless tobacco: Never Used   Substance and Sexual Activity   • Alcohol use: Never   • Drug use: Defer   • Sexual activity: Defer     Family History   Problem Relation Age of Onset   • Diabetes Mother    • Heart disease Mother    • Hypertension Mother    • Hyperlipidemia Mother    • Hypertension Father    • Heart disease Father    • Hyperlipidemia Father    • Aneurysm Father      Review of Systems   Constitutional: Positive for malaise/fatigue. Negative for chills, fever, night sweats and weight loss.   HENT: Negative for hearing loss, odynophagia and sore throat.    Cardiovascular: Positive for dyspnea on exertion. Negative for chest pain, leg swelling, orthopnea and palpitations.   Respiratory: Negative for cough and shortness of breath.    Hematologic/Lymphatic: Does not bruise/bleed easily.   Skin: Negative for itching and rash.   Musculoskeletal: Positive for falls. Negative for arthritis, joint pain, joint swelling and myalgias.   Gastrointestinal: Negative for abdominal pain, constipation, diarrhea, hematemesis, hematochezia, nausea and vomiting.   Genitourinary: Negative for dysuria, frequency and hematuria.   Neurological: Positive for loss of balance. Negative for focal weakness, headaches, numbness and seizures.   Psychiatric/Behavioral: Negative for suicidal ideas.  "    Vitals:    11/01/21 0925   BP: 137/80   BP Location: Right arm   Pulse: 88   Temp: 97.7 °F (36.5 °C)   SpO2: 98%   Weight: 70.8 kg (156 lb)   Height: 157.5 cm (62\")      Physical Exam    CONSTITUTIONAL: Alert and conversant, Well dressed, Well nourished, No acute distress  EYES: Sclera clean, Anicteric, Pupils equal  ENT: No nasal deviation, Trachea midline  NECK: No neck masses, Supple  LUNGS: No wheezing, Cough, non-congested  HEART: No rubs, No murmurs  GASTROINTESTINAL: Soft, non-distended, No masses, Non tender  to palpation, normal bowel sounds  NEURO: No motor deficits, No sensory deficits, Cranial Nerves 2 through 12 grossly intact  PSYCHIATRIC: Oriented to person, place and time, No memory deficits, Mood appropriate  VASCULAR: No carotid bruits, Femoral pulses palpable and symmetric.  The right radial pulse is palpable to the left radial pulses not palpable  MUSKULOSKELETAL: No extremity trauma or extremity asymmetry    The ROS, past medical history, surgical history, family history, social history and vitals were reviewed by myself and corrected as needed.      Labs/Imaging:  I reviewed the outside notes as well as the CT angiogram images demonstrating an occluded left subclavian artery.    Assessment/Plan:   The patient is a 61-year-old female who is being referred for an occluded left subclavian artery and numbness in the left hand and fingers.  The left hand is cool and the left arm is weak and has significant claudication symptoms.  We are unable to obtain a blood pressure in the left arm with a noninvasive cuff and CT angiogram demonstrates an occluded left subclavian artery.  I discussed with her a left carotid to subclavian bypass with the risks of bleeding, infection, graft failure, infection and death.  She agrees to proceed.  I told her the expected hospital stay is usually 1 night but it could, possibly, be 2 depending on individual circumstances.  She is agreeable to proceed and would like " to be scheduled soon as possible.    Patient Active Problem List   Diagnosis   • Peripheral edema   • COPD (chronic obstructive pulmonary disease) (HCC)   • Anxiety and depression   • Neck pain   • Seasonal allergies   • Subclavian steal syndrome of left subclavian artery   • Numbness and tingling in left arm   • Smoking   • Shortness of breath   • EKG abnormalities   • Bilateral carotid bruits   • Imbalance   • Left subclavian artery occlusion       CC: MD Yi Skelton APRN Regina Fugate editing for Narinder Leiva MD    I, Narinder Leiva MD, have read and agree with the editing done by Thuy Tavarez, .

## 2021-11-01 NOTE — H&P (VIEW-ONLY)
11/01/2021  Patient Information  Blaire Mena                                                                                          [address] [facility] [place] [address]   1960  'PCP/Referring Physician'  Sylvain Lujan MD  [phone]  Yi Dela Cruz APRN  [phone]  Chief Complaint   Patient presents with   • Consult     NP per SHARYN More for subclavian artery stenosis.       History of Present Illness:  This patient is a 61-year-old female who was referred to me to evaluate left arm numbness.  She states that for the last 3 to 4 months she has had weakness in the left arm and significant numbness in the fingers.  Subsequently, a CT angiogram demonstrated an occluded left subclavian artery.  In the office today we were unable to obtain an adequate blood pressure in the left arm with a noninvasive blood pressure cuff.  She does have chronic obstructive pulmonary disease and has ongoing tobacco abuse.  She denies any anginal symptoms and was evaluated by her cardiologist who referred her to me for the left subclavian occlusion.      Patient Active Problem List   Diagnosis   • Peripheral edema   • COPD (chronic obstructive pulmonary disease) ([facility])   • Anxiety and depression   • Neck pain   • Seasonal allergies   • Subclavian steal syndrome of left subclavian artery   • Numbness and tingling in left arm   • Smoking   • Shortness of breath   • EKG abnormalities   • Bilateral carotid bruits   • Imbalance   • Left subclavian artery occlusion     Past Medical History:   Diagnosis Date   • Arthritis    • Asthma    • COPD (chronic obstructive pulmonary disease) ([facility])    • Subclavian artery stenosis ([facility])      Past Surgical History:   Procedure Laterality Date   • APPENDECTOMY     • FEMUR FRACTURE SURGERY     • FIBULA FRACTURE SURGERY     • HYSTERECTOMY     • KNEE SURGERY Bilateral    • TIBIA FRACTURE SURGERY         Current Outpatient Medications:   •  aspirin (aspirin) 81 MG EC tablet, Take 1 tablet  by mouth Daily., Disp: 90 tablet, Rfl: 3  •  atorvastatin (LIPITOR) 40 MG tablet, Take 1 tablet by mouth Every Night., Disp: 30 tablet, Rfl: 11  •  vitamin B-12 (CYANOCOBALAMIN) 100 MCG tablet, Take 50 mcg by mouth Daily., Disp: , Rfl:   •  alendronate (FOSAMAX) 70 MG tablet, Take 70 mg by mouth Every 7 (Seven) Days., Disp: , Rfl:   Allergies   Allergen Reactions   • Sulfa Antibiotics Swelling     Social History     Socioeconomic History   • Marital status:    • Number of children: 3   Tobacco Use   • Smoking status: Current Every Day Smoker     Packs/day: 2.00     Years: 41.00     Pack years: 82.00     Types: Cigarettes   • Smokeless tobacco: Never Used   Substance and Sexual Activity   • Alcohol use: Never   • Drug use: Defer   • Sexual activity: Defer     Family History   Problem Relation Age of Onset   • Diabetes Mother    • Heart disease Mother    • Hypertension Mother    • Hyperlipidemia Mother    • Hypertension Father    • Heart disease Father    • Hyperlipidemia Father    • Aneurysm Father      Review of Systems   Constitutional: Positive for malaise/fatigue. Negative for chills, fever, night sweats and weight loss.   HENT: Negative for hearing loss, odynophagia and sore throat.    Cardiovascular: Positive for dyspnea on exertion. Negative for chest pain, leg swelling, orthopnea and palpitations.   Respiratory: Negative for cough and shortness of breath.    Hematologic/Lymphatic: Does not bruise/bleed easily.   Skin: Negative for itching and rash.   Musculoskeletal: Positive for falls. Negative for arthritis, joint pain, joint swelling and myalgias.   Gastrointestinal: Negative for abdominal pain, constipation, diarrhea, hematemesis, hematochezia, nausea and vomiting.   Genitourinary: Negative for dysuria, frequency and hematuria.   Neurological: Positive for loss of balance. Negative for focal weakness, headaches, numbness and seizures.   Psychiatric/Behavioral: Negative for suicidal ideas.  "    Vitals:    11/01/21 0925   BP: 137/80   BP Location: Right arm   Pulse: 88   Temp: 97.7 °F (36.5 °C)   SpO2: 98%   Weight: 70.8 kg (156 lb)   Height: 157.5 cm (62\")      Physical Exam    CONSTITUTIONAL: Alert and conversant, Well dressed, Well nourished, No acute distress  EYES: Sclera clean, Anicteric, Pupils equal  ENT: No nasal deviation, Trachea midline  NECK: No neck masses, Supple  LUNGS: No wheezing, Cough, non-congested  HEART: No rubs, No murmurs  GASTROINTESTINAL: Soft, non-distended, No masses, Non tender  to palpation, normal bowel sounds  NEURO: No motor deficits, No sensory deficits, Cranial Nerves 2 through 12 grossly intact  PSYCHIATRIC: Oriented to person, place and time, No memory deficits, Mood appropriate  VASCULAR: No carotid bruits, Femoral pulses palpable and symmetric.  The right radial pulse is palpable to the left radial pulses not palpable  MUSKULOSKELETAL: No extremity trauma or extremity asymmetry    The ROS, past medical history, surgical history, family history, social history and vitals were reviewed by myself and corrected as needed.      Labs/Imaging:  I reviewed the outside notes as well as the CT angiogram images demonstrating an occluded left subclavian artery.    Assessment/Plan:   The patient is a 61-year-old female who is being referred for an occluded left subclavian artery and numbness in the left hand and fingers.  The left hand is cool and the left arm is weak and has significant claudication symptoms.  We are unable to obtain a blood pressure in the left arm with a noninvasive cuff and CT angiogram demonstrates an occluded left subclavian artery.  I discussed with her a left carotid to subclavian bypass with the risks of bleeding, infection, graft failure, infection and death.  She agrees to proceed.  I told her the expected hospital stay is usually 1 night but it could, possibly, be 2 depending on individual circumstances.  She is agreeable to proceed and would like " to be scheduled soon as possible.    Patient Active Problem List   Diagnosis   • Peripheral edema   • COPD (chronic obstructive pulmonary disease) ([facility])   • Anxiety and depression   • Neck pain   • Seasonal allergies   • Subclavian steal syndrome of left subclavian artery   • Numbness and tingling in left arm   • Smoking   • Shortness of breath   • EKG abnormalities   • Bilateral carotid bruits   • Imbalance   • Left subclavian artery occlusion       CC: MD Yi Skelton APRN Regina Fugate editing for Narinder Leiva MD    I, Narinder Leiva MD, have read and agree with the editing done by Thuy Tavarez, .

## 2021-11-04 ENCOUNTER — PREP FOR SURGERY (OUTPATIENT)
Dept: OTHER | Facility: HOSPITAL | Age: 61
End: 2021-11-04

## 2021-11-04 DIAGNOSIS — I70.8 LEFT SUBCLAVIAN ARTERY OCCLUSION: Primary | ICD-10-CM

## 2021-11-04 RX ORDER — CHLORHEXIDINE GLUCONATE 500 MG/1
1 CLOTH TOPICAL EVERY 12 HOURS PRN
Status: CANCELLED | OUTPATIENT
Start: 2021-11-10

## 2021-11-08 ENCOUNTER — TELEPHONE (OUTPATIENT)
Dept: CARDIOLOGY | Facility: CLINIC | Age: 61
End: 2021-11-08

## 2021-11-08 NOTE — TELEPHONE ENCOUNTER
Received Cardiac Clearance on 11/08/2021 from Dr Leblanc     . Per SHARYN More     Cardiac Clearance needs to come from Dr Leiva office   Fax # [phone]      AZUCENA Jones

## 2021-11-10 ENCOUNTER — TELEPHONE (OUTPATIENT)
Dept: CARDIOLOGY | Facility: CLINIC | Age: 61
End: 2021-11-10

## 2021-11-10 ENCOUNTER — HOSPITAL ENCOUNTER (OUTPATIENT)
Dept: GENERAL RADIOLOGY | Facility: HOSPITAL | Age: 61
Discharge: HOME | End: 2021-11-10

## 2021-11-10 ENCOUNTER — PRE-ADMISSION TESTING (OUTPATIENT)
Dept: PREADMISSION TESTING | Facility: HOSPITAL | Age: 61
End: 2021-11-10

## 2021-11-10 VITALS — HEIGHT: 62 IN | WEIGHT: 154.54 LBS | BODY MASS INDEX: 28.44 KG/M2

## 2021-11-10 DIAGNOSIS — I70.8 LEFT SUBCLAVIAN ARTERY OCCLUSION: ICD-10-CM

## 2021-11-10 LAB
ABO GROUP BLD: NORMAL
ANION GAP SERPL CALCULATED.3IONS-SCNC: 10 MMOL/L (ref 5–15)
BLD GP AB SCN SERPL QL: NEGATIVE
BUN SERPL-MCNC: 13 MG/DL (ref 8–23)
BUN/CREAT SERPL: 26 (ref 7–25)
CALCIUM SPEC-SCNC: 9 MG/DL (ref 8.6–10.5)
CHLORIDE SERPL-SCNC: 103 MMOL/L (ref 98–107)
CO2 SERPL-SCNC: 24 MMOL/L (ref 22–29)
CREAT SERPL-MCNC: 0.5 MG/DL (ref 0.57–1)
DEPRECATED RDW RBC AUTO: 44.8 FL (ref 37–54)
ERYTHROCYTE [DISTWIDTH] IN BLOOD BY AUTOMATED COUNT: 13.2 % (ref 12.3–15.4)
GFR SERPL CREATININE-BSD FRML MDRD: 125 ML/MIN/1.73
GLUCOSE SERPL-MCNC: 102 MG/DL (ref 65–99)
HBA1C MFR BLD: 5.2 % (ref 4.8–5.6)
HCT VFR BLD AUTO: 41.7 % (ref 34–46.6)
HGB BLD-MCNC: 13.8 G/DL (ref 12–15.9)
INR PPP: 0.92 (ref 0.85–1.16)
MCH RBC QN AUTO: 30.3 PG (ref 26.6–33)
MCHC RBC AUTO-ENTMCNC: 33.1 G/DL (ref 31.5–35.7)
MCV RBC AUTO: 91.4 FL (ref 79–97)
PLATELET # BLD AUTO: 292 10*3/MM3 (ref 140–450)
PMV BLD AUTO: 9.5 FL (ref 6–12)
POTASSIUM SERPL-SCNC: 4.6 MMOL/L (ref 3.5–5.2)
PROTHROMBIN TIME: 12.1 SECONDS (ref 11.4–14.4)
RBC # BLD AUTO: 4.56 10*6/MM3 (ref 3.77–5.28)
RH BLD: NEGATIVE
SARS-COV-2 RNA PNL SPEC NAA+PROBE: NOT DETECTED
SODIUM SERPL-SCNC: 137 MMOL/L (ref 136–145)
T&S EXPIRATION DATE: NORMAL
WBC # BLD AUTO: 7.62 10*3/MM3 (ref 3.4–10.8)

## 2021-11-10 PROCEDURE — 86901 BLOOD TYPING SEROLOGIC RH(D): CPT

## 2021-11-10 PROCEDURE — U0004 COV-19 TEST NON-CDC HGH THRU: HCPCS

## 2021-11-10 PROCEDURE — 80048 BASIC METABOLIC PNL TOTAL CA: CPT

## 2021-11-10 PROCEDURE — 83036 HEMOGLOBIN GLYCOSYLATED A1C: CPT

## 2021-11-10 PROCEDURE — 71046 X-RAY EXAM CHEST 2 VIEWS: CPT

## 2021-11-10 PROCEDURE — 85610 PROTHROMBIN TIME: CPT

## 2021-11-10 PROCEDURE — C9803 HOPD COVID-19 SPEC COLLECT: HCPCS

## 2021-11-10 PROCEDURE — 36415 COLL VENOUS BLD VENIPUNCTURE: CPT

## 2021-11-10 PROCEDURE — 86850 RBC ANTIBODY SCREEN: CPT

## 2021-11-10 PROCEDURE — 85027 COMPLETE CBC AUTOMATED: CPT

## 2021-11-10 PROCEDURE — 86900 BLOOD TYPING SEROLOGIC ABO: CPT

## 2021-11-10 NOTE — PAT
Patient viewed general PAT education video as instructed in their preoperative information received from their surgeon.  Patient stated the general PAT education video was viewed in its entirety and survey completed.  Copies of [facility] general education handouts (Incentive Spirometry, Meds to Beds Program, Patient Belongings, Pre-op skin preparation instructions, Blood Glucose testing, Visitor policy, Surgery FAQ, Code H) distributed to patient if not printed. Education related to the PAT pass and skin preparation for surgery (if applicable) completed in [facility] as a reinforcement to PAT education video. Patient instructed to return PAT pass provided today as well as completed skin preparation sheet (if applicable) on the day of procedure.     Additionally if patient had not viewed video yet but intended to view it at home or in our waiting area, then referred them to the handout with QR code/link provided during PAT visit.  Instructed patient to complete survey after viewing the video in its entirety.  Encouraged patient/family to read [facility] general education handouts thoroughly and notify PAT staff with any questions or concerns. Patient verbalized understanding of all information and priority content.    Patient to apply Chlorhexadine wipes  to surgical area (as instructed) the night before procedure and the AM of procedure. Wipes provided.    Patient directed to Radiology Department for CXR after Pre Admission Testing Appointment.     Blood bank bracelet applied to patient during Pre Admission Testing visit.  Patient instructed not to remove from arm until after procedure and they are discharged from the hospital.  Explained to patient that they may shower and get the bracelet wet, but not to immerse under water for longer periods (bathing, swimming, hand dishwashing, etc).  Patient verbalized understanding.    LINDA AT DR COLVIN OFFICE CONTACTED FOR CARDIAC CLEARANCE

## 2021-11-10 NOTE — TELEPHONE ENCOUNTER
Received a call from Mercedes dobbs/ Dr. Leiva's office regd clearance from them, not from Dr. Leblanc. I informed her that both Yi and Leonor are currently seeing pt and that I would have to check in w/ them.       [phone] for Mercedes at that office.         I informed Leonor and Yi.

## 2021-11-10 NOTE — TELEPHONE ENCOUNTER
----- Message from SHARYN Shoemaker sent at 11/10/2021  2:32 PM EST -----  Can you put letter in chart for clearance ok from cardiac standpoint for carotid subclavian bypass.

## 2021-11-11 ENCOUNTER — ANESTHESIA EVENT (OUTPATIENT)
Dept: PERIOP | Facility: HOSPITAL | Age: 61
End: 2021-11-11

## 2021-11-11 RX ORDER — FAMOTIDINE 10 MG/ML
20 INJECTION, SOLUTION INTRAVENOUS ONCE
Status: CANCELLED | OUTPATIENT
Start: 2021-11-11 | End: 2021-11-11

## 2021-11-12 ENCOUNTER — APPOINTMENT (OUTPATIENT)
Dept: NEUROLOGY | Facility: HOSPITAL | Age: 61
End: 2021-11-12

## 2021-11-12 ENCOUNTER — HOSPITAL ENCOUNTER (INPATIENT)
Facility: HOSPITAL | Age: 61
LOS: 1 days | Discharge: HOME | End: 2021-11-13
Attending: THORACIC SURGERY (CARDIOTHORACIC VASCULAR SURGERY) | Admitting: STUDENT IN AN ORGANIZED HEALTH CARE EDUCATION/TRAINING PROGRAM

## 2021-11-12 ENCOUNTER — ANESTHESIA EVENT CONVERTED (OUTPATIENT)
Dept: ANESTHESIOLOGY | Facility: HOSPITAL | Age: 61
End: 2021-11-12

## 2021-11-12 ENCOUNTER — ANESTHESIA (OUTPATIENT)
Dept: PERIOP | Facility: HOSPITAL | Age: 61
End: 2021-11-12

## 2021-11-12 DIAGNOSIS — I70.8 LEFT SUBCLAVIAN ARTERY OCCLUSION: ICD-10-CM

## 2021-11-12 DIAGNOSIS — I70.8 LEFT SUBCLAVIAN ARTERY OCCLUSION: Primary | ICD-10-CM

## 2021-11-12 PROBLEM — E78.5 DYSLIPIDEMIA: Status: ACTIVE | Noted: 2021-11-12

## 2021-11-12 PROBLEM — Z72.0 TOBACCO USE: Status: ACTIVE | Noted: 2021-11-12

## 2021-11-12 PROBLEM — F41.9 ANXIETY AND DEPRESSION: Chronic | Status: ACTIVE | Noted: 2021-09-20

## 2021-11-12 PROBLEM — E78.5 DYSLIPIDEMIA: Chronic | Status: ACTIVE | Noted: 2021-11-12

## 2021-11-12 PROBLEM — Z72.0 TOBACCO USE: Chronic | Status: ACTIVE | Noted: 2021-11-12

## 2021-11-12 PROBLEM — F32.A ANXIETY AND DEPRESSION: Chronic | Status: ACTIVE | Noted: 2021-09-20

## 2021-11-12 LAB
ABO GROUP BLD: NORMAL
GLUCOSE BLDC GLUCOMTR-MCNC: 130 MG/DL (ref 70–130)
RH BLD: NEGATIVE

## 2021-11-12 PROCEDURE — 94799 UNLISTED PULMONARY SVC/PX: CPT

## 2021-11-12 PROCEDURE — 25010000002 PHENYLEPHRINE 10 MG/ML SOLUTION 1 ML VIAL: Performed by: NURSE ANESTHETIST, CERTIFIED REGISTERED

## 2021-11-12 PROCEDURE — 35301 RECHANNELING OF ARTERY: CPT | Performed by: THORACIC SURGERY (CARDIOTHORACIC VASCULAR SURGERY)

## 2021-11-12 PROCEDURE — 25010000002 FENTANYL CITRATE (PF) 50 MCG/ML SOLUTION: Performed by: NURSE ANESTHETIST, CERTIFIED REGISTERED

## 2021-11-12 PROCEDURE — 03CJ0ZZ EXTIRPATION OF MATTER FROM LEFT COMMON CAROTID ARTERY, OPEN APPROACH: ICD-10-PCS | Performed by: THORACIC SURGERY (CARDIOTHORACIC VASCULAR SURGERY)

## 2021-11-12 PROCEDURE — 25010000002 ONDANSETRON PER 1 MG

## 2021-11-12 PROCEDURE — 25010000002 NEOSTIGMINE 10 MG/10ML SOLUTION: Performed by: NURSE ANESTHETIST, CERTIFIED REGISTERED

## 2021-11-12 PROCEDURE — 25010000002 DEXAMETHASONE PER 1 MG: Performed by: NURSE ANESTHETIST, CERTIFIED REGISTERED

## 2021-11-12 PROCEDURE — 25010000002 HEPARIN (PORCINE) PER 1000 UNITS: Performed by: NURSE ANESTHETIST, CERTIFIED REGISTERED

## 2021-11-12 PROCEDURE — 25010000002 PROPOFOL 10 MG/ML EMULSION: Performed by: NURSE ANESTHETIST, CERTIFIED REGISTERED

## 2021-11-12 PROCEDURE — 25010000002 GENTAMICIN PER 80 MG: Performed by: THORACIC SURGERY (CARDIOTHORACIC VASCULAR SURGERY)

## 2021-11-12 PROCEDURE — 35606 BPG CAROTID-SUBCLAVIAN: CPT | Performed by: THORACIC SURGERY (CARDIOTHORACIC VASCULAR SURGERY)

## 2021-11-12 PROCEDURE — C2615 SEALANT, PULMONARY, LIQUID: HCPCS | Performed by: THORACIC SURGERY (CARDIOTHORACIC VASCULAR SURGERY)

## 2021-11-12 PROCEDURE — 88305 TISSUE EXAM BY PATHOLOGIST: CPT | Performed by: THORACIC SURGERY (CARDIOTHORACIC VASCULAR SURGERY)

## 2021-11-12 PROCEDURE — 0 CEFUROXIME SODIUM 1.5 G RECONSTITUTED SOLUTION

## 2021-11-12 PROCEDURE — 86900 BLOOD TYPING SEROLOGIC ABO: CPT

## 2021-11-12 PROCEDURE — 94640 AIRWAY INHALATION TREATMENT: CPT

## 2021-11-12 PROCEDURE — C1768 GRAFT, VASCULAR: HCPCS | Performed by: THORACIC SURGERY (CARDIOTHORACIC VASCULAR SURGERY)

## 2021-11-12 PROCEDURE — 95816 EEG AWAKE AND DROWSY: CPT

## 2021-11-12 PROCEDURE — 25010000002 PROTAMINE SULFATE PER 10 MG: Performed by: NURSE ANESTHETIST, CERTIFIED REGISTERED

## 2021-11-12 PROCEDURE — 95955 EEG DURING SURGERY: CPT

## 2021-11-12 PROCEDURE — 86901 BLOOD TYPING SEROLOGIC RH(D): CPT

## 2021-11-12 PROCEDURE — 0 CEFAZOLIN PER 500 MG: Performed by: THORACIC SURGERY (CARDIOTHORACIC VASCULAR SURGERY)

## 2021-11-12 PROCEDURE — 031J0JY BYPASS LEFT COMMON CAROTID ARTERY TO UPPER ARTERY WITH SYNTHETIC SUBSTITUTE, OPEN APPROACH: ICD-10-PCS | Performed by: THORACIC SURGERY (CARDIOTHORACIC VASCULAR SURGERY)

## 2021-11-12 PROCEDURE — 99222 1ST HOSP IP/OBS MODERATE 55: CPT | Performed by: INTERNAL MEDICINE

## 2021-11-12 PROCEDURE — 82962 GLUCOSE BLOOD TEST: CPT

## 2021-11-12 PROCEDURE — 0 CEFAZOLIN IN DEXTROSE 2-4 GM/100ML-% SOLUTION: Performed by: PHYSICIAN ASSISTANT

## 2021-11-12 DEVICE — PROGEL, PLEURAL AIR LEAK SEALANT, 4 ML KIT
Type: IMPLANTABLE DEVICE | Site: CAROTID | Status: FUNCTIONAL
Brand: PROGEL

## 2021-11-12 DEVICE — PROPATEN VASCULAR GRAFT TW RR 8MMX40CM 30CM RINGS HEPARIN
Type: IMPLANTABLE DEVICE | Site: CAROTID | Status: FUNCTIONAL
Brand: GORE PROPATEN VASCULAR GRAFT

## 2021-11-12 RX ORDER — FENTANYL CITRATE 50 UG/ML
INJECTION, SOLUTION INTRAMUSCULAR; INTRAVENOUS AS NEEDED
Status: DISCONTINUED | OUTPATIENT
Start: 2021-11-12 | End: 2021-11-12 | Stop reason: SURG

## 2021-11-12 RX ORDER — SODIUM CHLORIDE, SODIUM LACTATE, POTASSIUM CHLORIDE, CALCIUM CHLORIDE 600; 310; 30; 20 MG/100ML; MG/100ML; MG/100ML; MG/100ML
100 INJECTION, SOLUTION INTRAVENOUS CONTINUOUS
Status: DISCONTINUED | OUTPATIENT
Start: 2021-11-12 | End: 2021-11-12

## 2021-11-12 RX ORDER — ROCURONIUM BROMIDE 10 MG/ML
INJECTION, SOLUTION INTRAVENOUS AS NEEDED
Status: DISCONTINUED | OUTPATIENT
Start: 2021-11-12 | End: 2021-11-12 | Stop reason: SURG

## 2021-11-12 RX ORDER — PHENYLEPHRINE HYDROCHLORIDE 10 MG/ML
INJECTION INTRAVENOUS
Status: DISPENSED
Start: 2021-11-12 | End: 2021-11-12

## 2021-11-12 RX ORDER — HYDROCODONE BITARTRATE AND ACETAMINOPHEN 7.5; 325 MG/1; MG/1
1 TABLET ORAL EVERY 4 HOURS PRN
Status: DISCONTINUED | OUTPATIENT
Start: 2021-11-12 | End: 2021-11-13 | Stop reason: HOSPADM

## 2021-11-12 RX ORDER — SODIUM CHLORIDE 0.9 % (FLUSH) 0.9 %
10 SYRINGE (ML) INJECTION EVERY 12 HOURS SCHEDULED
Status: DISCONTINUED | OUTPATIENT
Start: 2021-11-12 | End: 2021-11-12 | Stop reason: HOSPADM

## 2021-11-12 RX ORDER — GLYCOPYRROLATE 0.2 MG/ML
INJECTION INTRAMUSCULAR; INTRAVENOUS AS NEEDED
Status: DISCONTINUED | OUTPATIENT
Start: 2021-11-12 | End: 2021-11-12 | Stop reason: SURG

## 2021-11-12 RX ORDER — ACETAMINOPHEN 325 MG/1
650 TABLET ORAL EVERY 4 HOURS PRN
Status: DISCONTINUED | OUTPATIENT
Start: 2021-11-12 | End: 2021-11-13 | Stop reason: HOSPADM

## 2021-11-12 RX ORDER — NEOSTIGMINE METHYLSULFATE 1 MG/ML
INJECTION, SOLUTION INTRAVENOUS AS NEEDED
Status: DISCONTINUED | OUTPATIENT
Start: 2021-11-12 | End: 2021-11-12 | Stop reason: SURG

## 2021-11-12 RX ORDER — PROTAMINE SULFATE 10 MG/ML
INJECTION, SOLUTION INTRAVENOUS AS NEEDED
Status: DISCONTINUED | OUTPATIENT
Start: 2021-11-12 | End: 2021-11-12 | Stop reason: SURG

## 2021-11-12 RX ORDER — LIDOCAINE HYDROCHLORIDE 10 MG/ML
0.5 INJECTION, SOLUTION EPIDURAL; INFILTRATION; INTRACAUDAL; PERINEURAL ONCE AS NEEDED
Status: COMPLETED | OUTPATIENT
Start: 2021-11-12 | End: 2021-11-12

## 2021-11-12 RX ORDER — ONDANSETRON 2 MG/ML
INJECTION INTRAMUSCULAR; INTRAVENOUS
Status: COMPLETED
Start: 2021-11-12 | End: 2021-11-12

## 2021-11-12 RX ORDER — SODIUM CHLORIDE 9 MG/ML
INJECTION, SOLUTION INTRAVENOUS AS NEEDED
Status: DISCONTINUED | OUTPATIENT
Start: 2021-11-12 | End: 2021-11-12 | Stop reason: HOSPADM

## 2021-11-12 RX ORDER — LABETALOL HYDROCHLORIDE 5 MG/ML
INJECTION, SOLUTION INTRAVENOUS AS NEEDED
Status: DISCONTINUED | OUTPATIENT
Start: 2021-11-12 | End: 2021-11-12 | Stop reason: SURG

## 2021-11-12 RX ORDER — ATORVASTATIN CALCIUM 40 MG/1
40 TABLET, FILM COATED ORAL NIGHTLY
Status: DISCONTINUED | OUTPATIENT
Start: 2021-11-12 | End: 2021-11-13 | Stop reason: HOSPADM

## 2021-11-12 RX ORDER — MORPHINE SULFATE 2 MG/ML
2 INJECTION, SOLUTION INTRAMUSCULAR; INTRAVENOUS
Status: DISCONTINUED | OUTPATIENT
Start: 2021-11-12 | End: 2021-11-13 | Stop reason: HOSPADM

## 2021-11-12 RX ORDER — NICARDIPINE HYDROCHLORIDE 2.5 MG/ML
INJECTION INTRAVENOUS
Status: DISPENSED
Start: 2021-11-12 | End: 2021-11-12

## 2021-11-12 RX ORDER — SODIUM CHLORIDE 0.9 % (FLUSH) 0.9 %
10 SYRINGE (ML) INJECTION AS NEEDED
Status: DISCONTINUED | OUTPATIENT
Start: 2021-11-12 | End: 2021-11-12 | Stop reason: HOSPADM

## 2021-11-12 RX ORDER — NALOXONE HCL 0.4 MG/ML
0.4 VIAL (ML) INJECTION AS NEEDED
Status: DISCONTINUED | OUTPATIENT
Start: 2021-11-12 | End: 2021-11-12 | Stop reason: HOSPADM

## 2021-11-12 RX ORDER — PROPOFOL 10 MG/ML
VIAL (ML) INTRAVENOUS AS NEEDED
Status: DISCONTINUED | OUTPATIENT
Start: 2021-11-12 | End: 2021-11-12 | Stop reason: SURG

## 2021-11-12 RX ORDER — IPRATROPIUM BROMIDE AND ALBUTEROL SULFATE 2.5; .5 MG/3ML; MG/3ML
3 SOLUTION RESPIRATORY (INHALATION)
Status: DISCONTINUED | OUTPATIENT
Start: 2021-11-12 | End: 2021-11-13 | Stop reason: HOSPADM

## 2021-11-12 RX ORDER — FAMOTIDINE 20 MG/1
20 TABLET, FILM COATED ORAL ONCE
Status: COMPLETED | OUTPATIENT
Start: 2021-11-12 | End: 2021-11-12

## 2021-11-12 RX ORDER — LIDOCAINE HYDROCHLORIDE 10 MG/ML
INJECTION, SOLUTION EPIDURAL; INFILTRATION; INTRACAUDAL; PERINEURAL AS NEEDED
Status: DISCONTINUED | OUTPATIENT
Start: 2021-11-12 | End: 2021-11-12 | Stop reason: HOSPADM

## 2021-11-12 RX ORDER — THROMBIN HUMAN AND FIBRINOGEN 2; 5.5 [USP'U]/1; MG/1
PATCH TOPICAL AS NEEDED
Status: DISCONTINUED | OUTPATIENT
Start: 2021-11-12 | End: 2021-11-12 | Stop reason: HOSPADM

## 2021-11-12 RX ORDER — FENTANYL CITRATE 50 UG/ML
50 INJECTION, SOLUTION INTRAMUSCULAR; INTRAVENOUS
Status: DISCONTINUED | OUTPATIENT
Start: 2021-11-12 | End: 2021-11-12 | Stop reason: HOSPADM

## 2021-11-12 RX ORDER — ONDANSETRON 4 MG/1
4 TABLET, FILM COATED ORAL EVERY 6 HOURS PRN
Status: DISCONTINUED | OUTPATIENT
Start: 2021-11-12 | End: 2021-11-13 | Stop reason: HOSPADM

## 2021-11-12 RX ORDER — LIDOCAINE HYDROCHLORIDE 10 MG/ML
INJECTION, SOLUTION EPIDURAL; INFILTRATION; INTRACAUDAL; PERINEURAL AS NEEDED
Status: DISCONTINUED | OUTPATIENT
Start: 2021-11-12 | End: 2021-11-12 | Stop reason: SURG

## 2021-11-12 RX ORDER — HYDROMORPHONE HYDROCHLORIDE 1 MG/ML
0.5 INJECTION, SOLUTION INTRAMUSCULAR; INTRAVENOUS; SUBCUTANEOUS
Status: DISCONTINUED | OUTPATIENT
Start: 2021-11-12 | End: 2021-11-12 | Stop reason: HOSPADM

## 2021-11-12 RX ORDER — HEPARIN SODIUM 1000 [USP'U]/ML
INJECTION, SOLUTION INTRAVENOUS; SUBCUTANEOUS AS NEEDED
Status: DISCONTINUED | OUTPATIENT
Start: 2021-11-12 | End: 2021-11-12 | Stop reason: SURG

## 2021-11-12 RX ORDER — ASPIRIN 81 MG/1
81 TABLET ORAL DAILY
Status: DISCONTINUED | OUTPATIENT
Start: 2021-11-13 | End: 2021-11-13 | Stop reason: HOSPADM

## 2021-11-12 RX ORDER — EPHEDRINE SULFATE 50 MG/ML
5 INJECTION, SOLUTION INTRAVENOUS ONCE AS NEEDED
Status: DISCONTINUED | OUTPATIENT
Start: 2021-11-12 | End: 2021-11-12 | Stop reason: HOSPADM

## 2021-11-12 RX ORDER — CLOPIDOGREL BISULFATE 75 MG/1
75 TABLET ORAL DAILY
Status: DISCONTINUED | OUTPATIENT
Start: 2021-11-13 | End: 2021-11-13 | Stop reason: HOSPADM

## 2021-11-12 RX ORDER — SODIUM CHLORIDE, SODIUM LACTATE, POTASSIUM CHLORIDE, CALCIUM CHLORIDE 600; 310; 30; 20 MG/100ML; MG/100ML; MG/100ML; MG/100ML
9 INJECTION, SOLUTION INTRAVENOUS CONTINUOUS
Status: DISCONTINUED | OUTPATIENT
Start: 2021-11-12 | End: 2021-11-12

## 2021-11-12 RX ORDER — ONDANSETRON 2 MG/ML
4 INJECTION INTRAMUSCULAR; INTRAVENOUS EVERY 6 HOURS PRN
Status: DISCONTINUED | OUTPATIENT
Start: 2021-11-12 | End: 2021-11-13 | Stop reason: HOSPADM

## 2021-11-12 RX ORDER — SODIUM CHLORIDE, SODIUM LACTATE, POTASSIUM CHLORIDE, CALCIUM CHLORIDE 600; 310; 30; 20 MG/100ML; MG/100ML; MG/100ML; MG/100ML
INJECTION, SOLUTION INTRAVENOUS CONTINUOUS PRN
Status: DISCONTINUED | OUTPATIENT
Start: 2021-11-12 | End: 2021-11-12 | Stop reason: SURG

## 2021-11-12 RX ORDER — CEFAZOLIN SODIUM 2 G/100ML
2 INJECTION, SOLUTION INTRAVENOUS EVERY 8 HOURS
Status: COMPLETED | OUTPATIENT
Start: 2021-11-12 | End: 2021-11-13

## 2021-11-12 RX ORDER — MEPERIDINE HYDROCHLORIDE 25 MG/ML
12.5 INJECTION INTRAMUSCULAR; INTRAVENOUS; SUBCUTANEOUS
Status: DISCONTINUED | OUTPATIENT
Start: 2021-11-12 | End: 2021-11-12 | Stop reason: HOSPADM

## 2021-11-12 RX ORDER — DEXAMETHASONE SODIUM PHOSPHATE 4 MG/ML
INJECTION, SOLUTION INTRA-ARTICULAR; INTRALESIONAL; INTRAMUSCULAR; INTRAVENOUS; SOFT TISSUE AS NEEDED
Status: DISCONTINUED | OUTPATIENT
Start: 2021-11-12 | End: 2021-11-12 | Stop reason: SURG

## 2021-11-12 RX ORDER — MIDAZOLAM HYDROCHLORIDE 1 MG/ML
1 INJECTION INTRAMUSCULAR; INTRAVENOUS
Status: DISCONTINUED | OUTPATIENT
Start: 2021-11-12 | End: 2021-11-12 | Stop reason: HOSPADM

## 2021-11-12 RX ORDER — ONDANSETRON 2 MG/ML
4 INJECTION INTRAMUSCULAR; INTRAVENOUS ONCE AS NEEDED
Status: COMPLETED | OUTPATIENT
Start: 2021-11-12 | End: 2021-11-12

## 2021-11-12 RX ORDER — SODIUM CHLORIDE 450 MG/100ML
75 INJECTION, SOLUTION INTRAVENOUS CONTINUOUS
Status: DISCONTINUED | OUTPATIENT
Start: 2021-11-12 | End: 2021-11-13 | Stop reason: HOSPADM

## 2021-11-12 RX ADMIN — FENTANYL CITRATE 100 MCG: 50 INJECTION, SOLUTION INTRAMUSCULAR; INTRAVENOUS at 07:20

## 2021-11-12 RX ADMIN — SODIUM CHLORIDE 15 MG/HR: 9 INJECTION, SOLUTION INTRAVENOUS at 09:00

## 2021-11-12 RX ADMIN — LABETALOL HYDROCHLORIDE 10 MG: 5 INJECTION, SOLUTION INTRAVENOUS at 08:24

## 2021-11-12 RX ADMIN — ROCURONIUM BROMIDE 50 MG: 10 INJECTION, SOLUTION INTRAVENOUS at 07:20

## 2021-11-12 RX ADMIN — HEPARIN SODIUM 5000 UNITS: 1000 INJECTION, SOLUTION INTRAVENOUS; SUBCUTANEOUS at 07:39

## 2021-11-12 RX ADMIN — IPRATROPIUM BROMIDE AND ALBUTEROL SULFATE 3 ML: 2.5; .5 SOLUTION RESPIRATORY (INHALATION) at 07:04

## 2021-11-12 RX ADMIN — SODIUM CHLORIDE 75 ML/HR: 4.5 INJECTION, SOLUTION INTRAVENOUS at 14:28

## 2021-11-12 RX ADMIN — ONDANSETRON 4 MG: 2 INJECTION INTRAMUSCULAR; INTRAVENOUS at 09:26

## 2021-11-12 RX ADMIN — HYDROCODONE BITARTRATE AND ACETAMINOPHEN 1 TABLET: 7.5; 325 TABLET ORAL at 17:59

## 2021-11-12 RX ADMIN — SODIUM CHLORIDE, POTASSIUM CHLORIDE, SODIUM LACTATE AND CALCIUM CHLORIDE: 600; 310; 30; 20 INJECTION, SOLUTION INTRAVENOUS at 07:14

## 2021-11-12 RX ADMIN — ATORVASTATIN CALCIUM 40 MG: 40 TABLET, FILM COATED ORAL at 20:26

## 2021-11-12 RX ADMIN — ACETAMINOPHEN 650 MG: 325 TABLET ORAL at 20:30

## 2021-11-12 RX ADMIN — LIDOCAINE HYDROCHLORIDE 0.5 ML: 10 INJECTION, SOLUTION EPIDURAL; INFILTRATION; INTRACAUDAL; PERINEURAL at 06:46

## 2021-11-12 RX ADMIN — FAMOTIDINE 20 MG: 20 TABLET ORAL at 06:46

## 2021-11-12 RX ADMIN — PROTAMINE SULFATE 50 MG: 10 INJECTION, SOLUTION INTRAVENOUS at 08:16

## 2021-11-12 RX ADMIN — PROPOFOL 150 MG: 10 INJECTION, EMULSION INTRAVENOUS at 07:20

## 2021-11-12 RX ADMIN — LIDOCAINE HYDROCHLORIDE 50 MG: 10 INJECTION, SOLUTION EPIDURAL; INFILTRATION; INTRACAUDAL; PERINEURAL at 07:20

## 2021-11-12 RX ADMIN — IPRATROPIUM BROMIDE AND ALBUTEROL SULFATE 3 ML: 2.5; .5 SOLUTION RESPIRATORY (INHALATION) at 15:39

## 2021-11-12 RX ADMIN — DEXAMETHASONE SODIUM PHOSPHATE 8 MG: 4 INJECTION, SOLUTION INTRA-ARTICULAR; INTRALESIONAL; INTRAMUSCULAR; INTRAVENOUS; SOFT TISSUE at 07:20

## 2021-11-12 RX ADMIN — CEFAZOLIN SODIUM 2 G: 2 INJECTION, SOLUTION INTRAVENOUS at 16:45

## 2021-11-12 RX ADMIN — IPRATROPIUM BROMIDE AND ALBUTEROL SULFATE 3 ML: 2.5; .5 SOLUTION RESPIRATORY (INHALATION) at 19:05

## 2021-11-12 RX ADMIN — NEOSTIGMINE METHYLSULFATE 3 MG: 0.5 INJECTION INTRAVENOUS at 08:27

## 2021-11-12 RX ADMIN — GLYCOPYRROLATE 0.4 MG: 0.2 INJECTION INTRAMUSCULAR; INTRAVENOUS at 08:27

## 2021-11-12 RX ADMIN — SODIUM CHLORIDE 7.5 MG/HR: 9 INJECTION, SOLUTION INTRAVENOUS at 12:04

## 2021-11-12 RX ADMIN — SODIUM CHLORIDE, POTASSIUM CHLORIDE, SODIUM LACTATE AND CALCIUM CHLORIDE 9 ML/HR: 600; 310; 30; 20 INJECTION, SOLUTION INTRAVENOUS at 06:45

## 2021-11-12 RX ADMIN — PHENYLEPHRINE HYDROCHLORIDE 0.5 MCG/KG/MIN: 10 INJECTION INTRAVENOUS at 07:43

## 2021-11-12 NOTE — INTERVAL H&P NOTE
[facility] Pre-op    Full history and physical note from office is attached.    VS: /74  HR 87  RR 16  T 98.1  Sat 96%RA      LAB Results:  Lab Results   Component Value Date    WBC 7.62 11/10/2021    HGB 13.8 11/10/2021    HCT 41.7 11/10/2021    MCV 91.4 11/10/2021     11/10/2021    NEUTROABS 4.51 09/20/2021    GLUCOSE 102 (H) 11/10/2021    BUN 13 11/10/2021    CREATININE 0.50 (L) 11/10/2021    EGFRIFNONA 125 11/10/2021     11/10/2021    K 4.6 11/10/2021     11/10/2021    CO2 24.0 11/10/2021    CALCIUM 9.0 11/10/2021    ALBUMIN 4.08 09/20/2021    AST 24 09/20/2021    ALT 12 09/20/2021    BILITOT 0.5 09/20/2021    INR 0.92 11/10/2021     9/22/21 carotid duplex:  Interpretation Summary    1.  The right common carotid artery is widely patent.  There is calcified plaque in the distal left common carotid artery which is discrete, concentric, and results in 16 to 49% stenosis by Doppler.  Diameter stenosis on echo is approximately 50%.     2.  The bulb and bifurcation are grossly widely patent bilaterally.     3.  16 to 49% stenosis by Doppler in both internal carotid arteries.     4.  Antegrade flow in the right vertebral artery.  There is minimal flow in the left vertebral artery.  There also appears to be reversal of flow in the left subclavian.     Summary: No obstructive carotid disease on either side.  Antegrade vertebral flow on the right, minimal flow on the left.  Reversal of flow in the left subclavian artery.    Cancer Staging (if applicable)  Cancer Patient: __ yes __no __unknown__N/A; If yes, clinical stage T:__ N:__M:__, stage group or __N/A      Impression: Left subclavian artery occlusion       Plan: CAROTID SUBCLAVIAN BYPASS LEFT  Agree the above.  Plan for left carotid to subclavian bypass today.  Patient is aware of the incision planned the procedure risk of stroke bleeding infection nerve injury and numbness into the left arm as well as possible graft failure or  graft infection.  These risks were explained in the office and then again for second occasion this morning and she agrees to proceed    SHARYN Klein   11/12/2021   06:39 EST

## 2021-11-12 NOTE — ANESTHESIA PROCEDURE NOTES
Arterial Line      Patient reassessed immediately prior to procedure    Patient location during procedure: pre-op   Line placed for hemodynamic monitoring.  Preanesthetic Checklist  Completed: patient identified, IV checked, site marked, risks and benefits discussed, surgical consent, monitors and equipment checked, pre-op evaluation and timeout performed  Arterial Line Prep   Sterile Tech: cap, gloves and sterile barriers  Prep: ChloraPrep  Patient monitoring: blood pressure monitoring, continuous pulse oximetry and EKG  Arterial Line Procedure   Laterality:right  Location:  radial artery  Catheter size: 20 G   Guidance: ultrasound guided  Number of attempts: 1  Successful placement: yes  Post Assessment   Dressing Type: line sutured, occlusive dressing applied, secured with tape and wrist guard applied.   Complications no  Circ/Move/Sens Assessment: normal and unchanged.   Patient Tolerance: patient tolerated the procedure well with no apparent complications

## 2021-11-12 NOTE — ANESTHESIA PREPROCEDURE EVALUATION
Anesthesia Evaluation     Patient summary reviewed and Nursing notes reviewed   NPO Solid Status: > 8 hours  NPO Liquid Status: > 2 hours           Airway   Mallampati: I  TM distance: >3 FB  Neck ROM: full  No difficulty expected  Dental      Pulmonary    (+) COPD, shortness of breath, wheezes,   Cardiovascular     ECG reviewed  Rhythm: regular  Rate: normal    (+) PVD, hyperlipidemia,       Neuro/Psych  (+) weakness,     GI/Hepatic/Renal/Endo      Musculoskeletal     (+) neck pain,   Abdominal    Substance History      OB/GYN          Other   arthritis,      ROS/Med Hx Other: · Left ventricular wall thickness is consistent with mild concentric hypertrophy.  · Calculated left ventricular 3D EF = 56% Left ventricular ejection fraction appears to be 61 - 65%. Left ventricular systolic function is normal.  · Left ventricular diastolic function is consistent with (grade I) impaired relaxation.  · Estimated right ventricular systolic pressure from tricuspid regurgitation is normal (<35 mmHg).    R arm weakness                       Anesthesia Plan    ASA 3     general   ([facility])  intravenous induction     Anesthetic plan, all risks, benefits, and alternatives have been provided, discussed and informed consent has been obtained with: patient.    Plan discussed with CRNA.

## 2021-11-12 NOTE — PAYOR COMM NOTE
"Carol Braga RN Utilization Review [phone]  Fax # [phone]          Reyna Mena (61 y.o. Female)             Date of Birth Social Security Number Address Home Phone MRN    1960  [address] [id number] [phone] [id number]    [employer] Marital Status             Uats[place]        Admission Date Admission Type Admitting Provider Attending Provider Department, Room/Bed    11/12/21 Elective Narinder Leiva MD Mitchell, Robert O, MD [facility] 2HSIC, S258/1    Discharge Date Discharge Disposition Discharge Destination                         Attending Provider: Narinder Leiva MD    Allergies: Sulfa Antibiotics    Isolation: None   Infection: None   Code Status: Not on file   Advance Care Planning Activity    Ht: 157.5 cm (62\")   Wt: 70.1 kg (154 lb 8.7 oz)    Admission Cmt: None   Principal Problem: Left subclavian artery occlusion [I70.8]                 Active Insurance as of 11/12/2021     Primary Coverage     Payor Plan Insurance Group Employer/Plan Group    [employer] [place] [employer] [place]ICA[place]      Payor Plan Address Payor Plan Phone Number Payor Plan Fax Number Effective Dates    [address] [address] [phone]  9/20/2021 - None Entered    [place] [address]       Subscriber Name Subscriber Birth Date Member ID       REYNA MENA 1960 [id number]                 Emergency Contacts      (Rel.) Home Phone Work Phone Mobile Phone    Bernarda Khalil (Daughter) [phone] -- --               History & Physical      Narinder Leiva MD at 11/12/21 [address] [place] [address] Pre-op    Full history and physical note from office is attached.    VS: /74  HR 87  RR 16  T 98.1  Sat 96%RA      LAB Results:  Lab Results   Component Value Date    WBC 7.62 11/10/2021    HGB 13.8 11/10/2021    HCT 41.7 11/10/2021    MCV 91.4 11/10/2021     11/10/2021    NEUTROABS 4.51 09/20/2021    GLUCOSE 102 (H) 11/10/2021 "    BUN 13 11/10/2021    CREATININE 0.50 (L) 11/10/2021    EGFRIFNONA 125 11/10/2021     11/10/2021    K 4.6 11/10/2021     11/10/2021    CO2 24.0 11/10/2021    CALCIUM 9.0 11/10/2021    ALBUMIN 4.08 09/20/2021    AST 24 09/20/2021    ALT 12 09/20/2021    BILITOT 0.5 09/20/2021    INR 0.92 11/10/2021     9/22/21 carotid duplex:  Interpretation Summary    1.  The right common carotid artery is widely patent.  There is calcified plaque in the distal left common carotid artery which is discrete, concentric, and results in 16 to 49% stenosis by Doppler.  Diameter stenosis on echo is approximately 50%.     2.  The bulb and bifurcation are grossly widely patent bilaterally.     3.  16 to 49% stenosis by Doppler in both internal carotid arteries.     4.  Antegrade flow in the right vertebral artery.  There is minimal flow in the left vertebral artery.  There also appears to be reversal of flow in the left subclavian.     Summary: No obstructive carotid disease on either side.  Antegrade vertebral flow on the right, minimal flow on the left.  Reversal of flow in the left subclavian artery.    Cancer Staging (if applicable)  Cancer Patient: __ yes __no __unknown__N/A; If yes, clinical stage T:__ N:__M:__, stage group or __N/A      Impression: Left subclavian artery occlusion       Plan: CAROTID SUBCLAVIAN BYPASS LEFT  Agree the above.  Plan for left carotid to subclavian bypass today.  Patient is aware of the incision planned the procedure risk of stroke bleeding infection nerve injury and numbness into the left arm as well as possible graft failure or graft infection.  These risks were explained in the office and then again for second occasion this morning and she agrees to proceed    SHARYN Klein   11/12/2021   06:39 EST     Electronically signed by Narinder Leiva MD at 11/12/21 0644   Source Note          11/01/2021  Patient Information  Blaire Mena                                                                                           [address] [facility] [address]   1960  'PCP/Referring Physician'  Sylvain Lujan MD  [phone]  Yi Dela Cruz APRN  [phone]  Chief Complaint   Patient presents with   • Consult     NP per SHARYN More for subclavian artery stenosis.       History of Present Illness:  This patient is a 61-year-old female who was referred to me to evaluate left arm numbness.  She states that for the last 3 to 4 months she has had weakness in the left arm and significant numbness in the fingers.  Subsequently, a CT angiogram demonstrated an occluded left subclavian artery.  In the office today we were unable to obtain an adequate blood pressure in the left arm with a noninvasive blood pressure cuff.  She does have chronic obstructive pulmonary disease and has ongoing tobacco abuse.  She denies any anginal symptoms and was evaluated by her cardiologist who referred her to me for the left subclavian occlusion.      Patient Active Problem List   Diagnosis   • Peripheral edema   • COPD (chronic obstructive pulmonary disease) ([facility])   • Anxiety and depression   • Neck pain   • Seasonal allergies   • Subclavian steal syndrome of left subclavian artery   • Numbness and tingling in left arm   • Smoking   • Shortness of breath   • EKG abnormalities   • Bilateral carotid bruits   • Imbalance   • Left subclavian artery occlusion     Past Medical History:   Diagnosis Date   • Arthritis    • Asthma    • COPD (chronic obstructive pulmonary disease) ([facility])    • Subclavian artery stenosis ([facility])      Past Surgical History:   Procedure Laterality Date   • APPENDECTOMY     • FEMUR FRACTURE SURGERY     • FIBULA FRACTURE SURGERY     • HYSTERECTOMY     • KNEE SURGERY Bilateral    • TIBIA FRACTURE SURGERY         Current Outpatient Medications:   •  aspirin (aspirin) 81 MG EC tablet, Take 1 tablet by mouth Daily., Disp: 90 tablet, Rfl: 3  •  atorvastatin (LIPITOR) 40 MG tablet, Take 1  tablet by mouth Every Night., Disp: 30 tablet, Rfl: 11  •  vitamin B-12 (CYANOCOBALAMIN) 100 MCG tablet, Take 50 mcg by mouth Daily., Disp: , Rfl:   •  alendronate (FOSAMAX) 70 MG tablet, Take 70 mg by mouth Every 7 (Seven) Days., Disp: , Rfl:   Allergies   Allergen Reactions   • Sulfa Antibiotics Swelling     Social History     Socioeconomic History   • Marital status:    • Number of children: 3   Tobacco Use   • Smoking status: Current Every Day Smoker     Packs/day: 2.00     Years: 41.00     Pack years: 82.00     Types: Cigarettes   • Smokeless tobacco: Never Used   Substance and Sexual Activity   • Alcohol use: Never   • Drug use: Defer   • Sexual activity: Defer     Family History   Problem Relation Age of Onset   • Diabetes Mother    • Heart disease Mother    • Hypertension Mother    • Hyperlipidemia Mother    • Hypertension Father    • Heart disease Father    • Hyperlipidemia Father    • Aneurysm Father      Review of Systems   Constitutional: Positive for malaise/fatigue. Negative for chills, fever, night sweats and weight loss.   HENT: Negative for hearing loss, odynophagia and sore throat.    Cardiovascular: Positive for dyspnea on exertion. Negative for chest pain, leg swelling, orthopnea and palpitations.   Respiratory: Negative for cough and shortness of breath.    Hematologic/Lymphatic: Does not bruise/bleed easily.   Skin: Negative for itching and rash.   Musculoskeletal: Positive for falls. Negative for arthritis, joint pain, joint swelling and myalgias.   Gastrointestinal: Negative for abdominal pain, constipation, diarrhea, hematemesis, hematochezia, nausea and vomiting.   Genitourinary: Negative for dysuria, frequency and hematuria.   Neurological: Positive for loss of balance. Negative for focal weakness, headaches, numbness and seizures.   Psychiatric/Behavioral: Negative for suicidal ideas.     Vitals:    11/01/21 0925   BP: 137/80   BP Location: Right arm   Pulse: 88   Temp: 97.7 °F  "(36.5 °C)   SpO2: 98%   Weight: 70.8 kg (156 lb)   Height: 157.5 cm (62\")      Physical Exam    CONSTITUTIONAL: Alert and conversant, Well dressed, Well nourished, No acute distress  EYES: Sclera clean, Anicteric, Pupils equal  ENT: No nasal deviation, Trachea midline  NECK: No neck masses, Supple  LUNGS: No wheezing, Cough, non-congested  HEART: No rubs, No murmurs  GASTROINTESTINAL: Soft, non-distended, No masses, Non tender  to palpation, normal bowel sounds  NEURO: No motor deficits, No sensory deficits, Cranial Nerves 2 through 12 grossly intact  PSYCHIATRIC: Oriented to person, place and time, No memory deficits, Mood appropriate  VASCULAR: No carotid bruits, Femoral pulses palpable and symmetric.  The right radial pulse is palpable to the left radial pulses not palpable  MUSKULOSKELETAL: No extremity trauma or extremity asymmetry    The ROS, past medical history, surgical history, family history, social history and vitals were reviewed by myself and corrected as needed.      Labs/Imaging:  I reviewed the outside notes as well as the CT angiogram images demonstrating an occluded left subclavian artery.    Assessment/Plan:   The patient is a 61-year-old female who is being referred for an occluded left subclavian artery and numbness in the left hand and fingers.  The left hand is cool and the left arm is weak and has significant claudication symptoms.  We are unable to obtain a blood pressure in the left arm with a noninvasive cuff and CT angiogram demonstrates an occluded left subclavian artery.  I discussed with her a left carotid to subclavian bypass with the risks of bleeding, infection, graft failure, infection and death.  She agrees to proceed.  I told her the expected hospital stay is usually 1 night but it could, possibly, be 2 depending on individual circumstances.  She is agreeable to proceed and would like to be scheduled soon as possible.    Patient Active Problem List   Diagnosis   • Peripheral " edema   • COPD (chronic obstructive pulmonary disease) ([facility])   • Anxiety and depression   • Neck pain   • Seasonal allergies   • Subclavian steal syndrome of left subclavian artery   • Numbness and tingling in left arm   • Smoking   • Shortness of breath   • EKG abnormalities   • Bilateral carotid bruits   • Imbalance   • Left subclavian artery occlusion       CC: MD Yi Skelton APRN Regina Fugate editing for Narinder Leiva MD    I, Narinder Leiva MD, have read and agree with the editing done by Thuy Tavarez, .        Electronically signed by Narinder Leiva MD at 11/01/21 1120             Narinder Leiva MD at 11/01/21 0915          11/01/2021  Patient Information  Blaire Mena                                                                                          [address] [address]   1960  'PCP/Referring Physician'  Sylvain Lujan MD  [phone]  Yi Dela Cruz APRN  [phone]  Chief Complaint   Patient presents with   • Consult     NP per SHARYN More for subclavian artery stenosis.       History of Present Illness:  This patient is a 61-year-old female who was referred to me to evaluate left arm numbness.  She states that for the last 3 to 4 months she has had weakness in the left arm and significant numbness in the fingers.  Subsequently, a CT angiogram demonstrated an occluded left subclavian artery.  In the office today we were unable to obtain an adequate blood pressure in the left arm with a noninvasive blood pressure cuff.  She does have chronic obstructive pulmonary disease and has ongoing tobacco abuse.  She denies any anginal symptoms and was evaluated by her cardiologist who referred her to me for the left subclavian occlusion.      Patient Active Problem List   Diagnosis   • Peripheral edema   • COPD (chronic obstructive pulmonary disease) ([facility])   • Anxiety and depression   • Neck pain   • Seasonal  allergies   • Subclavian steal syndrome of left subclavian artery   • Numbness and tingling in left arm   • Smoking   • Shortness of breath   • EKG abnormalities   • Bilateral carotid bruits   • Imbalance   • Left subclavian artery occlusion     Past Medical History:   Diagnosis Date   • Arthritis    • Asthma    • COPD (chronic obstructive pulmonary disease) ([facility])    • Subclavian artery stenosis (H[facility])      Past Surgical History:   Procedure Laterality Date   • APPENDECTOMY     • FEMUR FRACTURE SURGERY     • FIBULA FRACTURE SURGERY     • HYSTERECTOMY     • KNEE SURGERY Bilateral    • TIBIA FRACTURE SURGERY         Current Outpatient Medications:   •  aspirin (aspirin) 81 MG EC tablet, Take 1 tablet by mouth Daily., Disp: 90 tablet, Rfl: 3  •  atorvastatin (LIPITOR) 40 MG tablet, Take 1 tablet by mouth Every Night., Disp: 30 tablet, Rfl: 11  •  vitamin B-12 (CYANOCOBALAMIN) 100 MCG tablet, Take 50 mcg by mouth Daily., Disp: , Rfl:   •  alendronate (FOSAMAX) 70 MG tablet, Take 70 mg by mouth Every 7 (Seven) Days., Disp: , Rfl:   Allergies   Allergen Reactions   • Sulfa Antibiotics Swelling     Social History     Socioeconomic History   • Marital status:    • Number of children: 3   Tobacco Use   • Smoking status: Current Every Day Smoker     Packs/day: 2.00     Years: 41.00     Pack years: 82.00     Types: Cigarettes   • Smokeless tobacco: Never Used   Substance and Sexual Activity   • Alcohol use: Never   • Drug use: Defer   • Sexual activity: Defer     Family History   Problem Relation Age of Onset   • Diabetes Mother    • Heart disease Mother    • Hypertension Mother    • Hyperlipidemia Mother    • Hypertension Father    • Heart disease Father    • Hyperlipidemia Father    • Aneurysm Father      Review of Systems   Constitutional: Positive for malaise/fatigue. Negative for chills, fever, night sweats and weight loss.   HENT: Negative for hearing loss, odynophagia and sore throat.    Cardiovascular: Positive  "for dyspnea on exertion. Negative for chest pain, leg swelling, orthopnea and palpitations.   Respiratory: Negative for cough and shortness of breath.    Hematologic/Lymphatic: Does not bruise/bleed easily.   Skin: Negative for itching and rash.   Musculoskeletal: Positive for falls. Negative for arthritis, joint pain, joint swelling and myalgias.   Gastrointestinal: Negative for abdominal pain, constipation, diarrhea, hematemesis, hematochezia, nausea and vomiting.   Genitourinary: Negative for dysuria, frequency and hematuria.   Neurological: Positive for loss of balance. Negative for focal weakness, headaches, numbness and seizures.   Psychiatric/Behavioral: Negative for suicidal ideas.     Vitals:    11/01/21 0925   BP: 137/80   BP Location: Right arm   Pulse: 88   Temp: 97.7 °F (36.5 °C)   SpO2: 98%   Weight: 70.8 kg (156 lb)   Height: 157.5 cm (62\")      Physical Exam    CONSTITUTIONAL: Alert and conversant, Well dressed, Well nourished, No acute distress  EYES: Sclera clean, Anicteric, Pupils equal  ENT: No nasal deviation, Trachea midline  NECK: No neck masses, Supple  LUNGS: No wheezing, Cough, non-congested  HEART: No rubs, No murmurs  GASTROINTESTINAL: Soft, non-distended, No masses, Non tender  to palpation, normal bowel sounds  NEURO: No motor deficits, No sensory deficits, Cranial Nerves 2 through 12 grossly intact  PSYCHIATRIC: Oriented to person, place and time, No memory deficits, Mood appropriate  VASCULAR: No carotid bruits, Femoral pulses palpable and symmetric.  The right radial pulse is palpable to the left radial pulses not palpable  MUSKULOSKELETAL: No extremity trauma or extremity asymmetry    The ROS, past medical history, surgical history, family history, social history and vitals were reviewed by myself and corrected as needed.      Labs/Imaging:  I reviewed the outside notes as well as the CT angiogram images demonstrating an occluded left subclavian artery.    Assessment/Plan:   The " patient is a 61-year-old female who is being referred for an occluded left subclavian artery and numbness in the left hand and fingers.  The left hand is cool and the left arm is weak and has significant claudication symptoms.  We are unable to obtain a blood pressure in the left arm with a noninvasive cuff and CT angiogram demonstrates an occluded left subclavian artery.  I discussed with her a left carotid to subclavian bypass with the risks of bleeding, infection, graft failure, infection and death.  She agrees to proceed.  I told her the expected hospital stay is usually 1 night but it could, possibly, be 2 depending on individual circumstances.  She is agreeable to proceed and would like to be scheduled soon as possible.    Patient Active Problem List   Diagnosis   • Peripheral edema   • COPD (chronic obstructive pulmonary disease) ([facility])   • Anxiety and depression   • Neck pain   • Seasonal allergies   • Subclavian steal syndrome of left subclavian artery   • Numbness and tingling in left arm   • Smoking   • Shortness of breath   • EKG abnormalities   • Bilateral carotid bruits   • Imbalance   • Left subclavian artery occlusion       CC: MD Yi Skelton APRN Regina Fugate editing for Narinder Leiva MD    I, Narinder Leiva MD, have read and agree with the editing done by Thuy Tavarez, .        Electronically signed by Narinder Leiva MD at 11/01/21 1120          Operative/Procedure Notes (all)      Narinder Leiva MD at 11/12/21 2611  Version 1 of 1       Operative Report    Preop Diagnosis: Left arm claudication.  Peripheral arterial vascular disease.  Occlusion of the left subclavian artery.      Postoperative Diagnosis: Same with stenosis of the left common carotid artery.      Procedure: Endarterectomy of the left common carotid artery.  Left carotid to subclavian bypass with 8 mm ringed PTFE graft with EEG monitoring        Surgeons: Narinder Leiva  MD      Assistant: López Mcdonald MD    The Assistant provided services of suctioning, irrigation and retraction.  Also, assisted in suture closure of parts of the skin incision.      Indication: Patient was referred with the above medical problems with left arm claudication for left carotid to subclavian bypass.  She was aware of the procedure risk of stroke bleeding infection arm numbness and death and agreed to proceed.  Also aware of the risk of graft failure and graft infection and agreed to proceed.  These risks have been explained on 2 separate occasions        Description: Supine position.  Sterile prep and drape.  Antibiotics given.  An incision was made over the left clavicle.  We then dissected down to the platysma to expose the left subclavian artery with care taken to identify not injure the brachial plexus structures.  We then identified the common carotid artery low in the neck.  Patient was heparinized.  The common carotid artery was clamped and opened it was then we noticed there was significant plaquing in the common carotid artery much more than we had anticipated from the CT angiogram.  Dr. Mcdonald and I had to perform an extensive endarterectomy of the common carotid artery.  We then at this point proceeded with the carotid to subclavian bypass first perform the anastomosis of the left carotid artery to the 8 mm PTFE propatent graft using 6-0 Prolene suture.  This was then flushed free of air and debris in the proper sequential fashion to prevent emboli into the cerebral circulation.  No deleterious EEG changes were encountered.  We then turned our attention to the left subclavian artery which was clamped proximally and distally this artery was opened there was a good quality vessel in the distal end of the 8 mm graft was sutured to the left subclavian artery with 6-0 Prolene suture.  This was again flushed in the proper sequential fashion.  Good hemostasis was obtained and pro gel vascular sealant was  also used to help in hemostasis.  The incision was closed multiple layers of suture and the sponge and needle count reported as correct.  Blood loss was less than 75 mL.  A good left radial pulse was palpable following the procedure      Please note that portions of this note were completed with a voice recognition program. Efforts were made to edit the dictations, but occasionally words are mistranscribed.      Electronically signed by Narinder Leiva MD at 11/12/21 0900

## 2021-11-12 NOTE — ANESTHESIA PROCEDURE NOTES
Airway  Urgency: elective    Date/Time: 11/12/2021 7:21 AM  Airway not difficult    General Information and Staff    Patient location during procedure: OR  CRNA: Familia Humphreys CRNA    Indications and Patient Condition  Indications for airway management: airway protection    Preoxygenated: yes  MILS not maintained throughout  Mask difficulty assessment: 1 - vent by mask    Final Airway Details  Final airway type: endotracheal airway      Successful airway: ETT  Cuffed: yes   Successful intubation technique: direct laryngoscopy  Endotracheal tube insertion site: oral  Blade: Kruse  Blade size: 2  ETT size (mm): 7.0  Cormack-Lehane Classification: grade I - full view of glottis  Placement verified by: chest auscultation and capnometry   Cuff volume (mL): 5  Measured from: lips  ETT/EBT  to lips (cm): 20  Number of attempts at approach: 1  Assessment: lips, teeth, and gum same as pre-op and atraumatic intubation    Additional Comments  Negative epigastric sounds, Breath sound equal bilaterally with symmetric chest rise and fall

## 2021-11-12 NOTE — ANESTHESIA POSTPROCEDURE EVALUATION
Patient: Blaire Mena    Procedure Summary     Date: 11/12/21 Room / Location:  [facility]  [facility]    Anesthesia Start: 0714 Anesthesia Stop:     Procedure: CAROTID SUBCLAVIAN BYPASS LEFT (N/A Neck) Diagnosis:       Left subclavian artery occlusion      (Left subclavian artery occlusion [I70.8])    Surgeons: Narinder Leiva MD Provider: Sb Gurrola MD    Anesthesia Type: general ASA Status: 3          Anesthesia Type: general    Vitals  Vitals Value Taken Time   BP 84/38 11/12/21 0843   Temp     Pulse 64 11/12/21 0847   Resp     SpO2 92 % 11/12/21 0847   Vitals shown include unvalidated device data.        Post Anesthesia Care and Evaluation    Patient location during evaluation: PACU  Patient participation: complete - patient participated  Level of consciousness: awake and alert  Pain score: 0  Pain management: adequate  Airway patency: patent  Anesthetic complications: No anesthetic complications  PONV Status: none  Cardiovascular status: hemodynamically stable and acceptable  Respiratory status: nonlabored ventilation, acceptable and nasal cannula  Hydration status: acceptable

## 2021-11-12 NOTE — OP NOTE
Operative Report    Preop Diagnosis: Left arm claudication.  Peripheral arterial vascular disease.  Occlusion of the left subclavian artery.      Postoperative Diagnosis: Same with stenosis of the left common carotid artery.      Procedure: Endarterectomy of the left common carotid artery.  Left carotid to subclavian bypass with 8 mm ringed PTFE graft with EEG monitoring        Surgeons: Narinder Leiva MD      Assistant: López Mcdonald MD    The Assistant provided services of suctioning, irrigation and retraction.  Also, assisted in suture closure of parts of the skin incision.      Indication: Patient was referred with the above medical problems with left arm claudication for left carotid to subclavian bypass.  She was aware of the procedure risk of stroke bleeding infection arm numbness and death and agreed to proceed.  Also aware of the risk of graft failure and graft infection and agreed to proceed.  These risks have been explained on 2 separate occasions        Description: Supine position.  Sterile prep and drape.  Antibiotics given.  An incision was made over the left clavicle.  We then dissected down to the platysma to expose the left subclavian artery with care taken to identify not injure the brachial plexus structures.  We then identified the common carotid artery low in the neck.  Patient was heparinized.  The common carotid artery was clamped and opened it was then we noticed there was significant plaquing in the common carotid artery much more than we had anticipated from the CT angiogram.  Dr. Mcdonald and I had to perform an extensive endarterectomy of the common carotid artery.  We then at this point proceeded with the carotid to subclavian bypass first perform the anastomosis of the left carotid artery to the 8 mm PTFE propatent graft using 6-0 Prolene suture.  This was then flushed free of air and debris in the proper sequential fashion to prevent emboli into the cerebral circulation.  No deleterious  EEG changes were encountered.  We then turned our attention to the left subclavian artery which was clamped proximally and distally this artery was opened there was a good quality vessel in the distal end of the 8 mm graft was sutured to the left subclavian artery with 6-0 Prolene suture.  This was again flushed in the proper sequential fashion.  Good hemostasis was obtained and pro gel vascular sealant was also used to help in hemostasis.  The incision was closed multiple layers of suture and the sponge and needle count reported as correct.  Blood loss was less than 75 mL.  A good left radial pulse was palpable following the procedure      Please note that portions of this note were completed with a voice recognition program. Efforts were made to edit the dictations, but occasionally words are mistranscribed.

## 2021-11-13 ENCOUNTER — READMISSION MANAGEMENT (OUTPATIENT)
Dept: CALL CENTER | Facility: HOSPITAL | Age: 61
End: 2021-11-13

## 2021-11-13 VITALS
TEMPERATURE: 97.7 F | BODY MASS INDEX: 28.44 KG/M2 | RESPIRATION RATE: 20 BRPM | HEIGHT: 62 IN | OXYGEN SATURATION: 94 % | WEIGHT: 154.54 LBS | HEART RATE: 93 BPM | DIASTOLIC BLOOD PRESSURE: 64 MMHG | SYSTOLIC BLOOD PRESSURE: 124 MMHG

## 2021-11-13 LAB
ANION GAP SERPL CALCULATED.3IONS-SCNC: 12 MMOL/L (ref 5–15)
BASOPHILS # BLD AUTO: 0.02 10*3/MM3 (ref 0–0.2)
BASOPHILS NFR BLD AUTO: 0.1 % (ref 0–1.5)
BUN SERPL-MCNC: 13 MG/DL (ref 8–23)
BUN/CREAT SERPL: 27.1 (ref 7–25)
CALCIUM SPEC-SCNC: 8.8 MG/DL (ref 8.6–10.5)
CHLORIDE SERPL-SCNC: 98 MMOL/L (ref 98–107)
CO2 SERPL-SCNC: 23 MMOL/L (ref 22–29)
CREAT SERPL-MCNC: 0.48 MG/DL (ref 0.57–1)
DEPRECATED RDW RBC AUTO: 43.7 FL (ref 37–54)
EOSINOPHIL # BLD AUTO: 0 10*3/MM3 (ref 0–0.4)
EOSINOPHIL NFR BLD AUTO: 0 % (ref 0.3–6.2)
ERYTHROCYTE [DISTWIDTH] IN BLOOD BY AUTOMATED COUNT: 13.2 % (ref 12.3–15.4)
GFR SERPL CREATININE-BSD FRML MDRD: 131 ML/MIN/1.73
GLUCOSE SERPL-MCNC: 146 MG/DL (ref 65–99)
HCT VFR BLD AUTO: 43.7 % (ref 34–46.6)
HGB BLD-MCNC: 14.6 G/DL (ref 12–15.9)
IMM GRANULOCYTES # BLD AUTO: 0.1 10*3/MM3 (ref 0–0.05)
IMM GRANULOCYTES NFR BLD AUTO: 0.7 % (ref 0–0.5)
LYMPHOCYTES # BLD AUTO: 1.35 10*3/MM3 (ref 0.7–3.1)
LYMPHOCYTES NFR BLD AUTO: 9.1 % (ref 19.6–45.3)
MCH RBC QN AUTO: 30 PG (ref 26.6–33)
MCHC RBC AUTO-ENTMCNC: 33.4 G/DL (ref 31.5–35.7)
MCV RBC AUTO: 89.7 FL (ref 79–97)
MONOCYTES # BLD AUTO: 0.79 10*3/MM3 (ref 0.1–0.9)
MONOCYTES NFR BLD AUTO: 5.3 % (ref 5–12)
NEUTROPHILS NFR BLD AUTO: 12.65 10*3/MM3 (ref 1.7–7)
NEUTROPHILS NFR BLD AUTO: 84.8 % (ref 42.7–76)
NRBC BLD AUTO-RTO: 0 /100 WBC (ref 0–0.2)
PLATELET # BLD AUTO: 370 10*3/MM3 (ref 140–450)
PMV BLD AUTO: 9.7 FL (ref 6–12)
POTASSIUM SERPL-SCNC: 3.7 MMOL/L (ref 3.5–5.2)
RBC # BLD AUTO: 4.87 10*6/MM3 (ref 3.77–5.28)
SODIUM SERPL-SCNC: 133 MMOL/L (ref 136–145)
WBC # BLD AUTO: 14.91 10*3/MM3 (ref 3.4–10.8)

## 2021-11-13 PROCEDURE — 94799 UNLISTED PULMONARY SVC/PX: CPT

## 2021-11-13 PROCEDURE — 80048 BASIC METABOLIC PNL TOTAL CA: CPT | Performed by: PHYSICIAN ASSISTANT

## 2021-11-13 PROCEDURE — 99024 POSTOP FOLLOW-UP VISIT: CPT | Performed by: PHYSICIAN ASSISTANT

## 2021-11-13 PROCEDURE — 85025 COMPLETE CBC W/AUTO DIFF WBC: CPT | Performed by: PHYSICIAN ASSISTANT

## 2021-11-13 PROCEDURE — 99231 SBSQ HOSP IP/OBS SF/LOW 25: CPT | Performed by: INTERNAL MEDICINE

## 2021-11-13 PROCEDURE — G0008 ADMIN INFLUENZA VIRUS VAC: HCPCS | Performed by: THORACIC SURGERY (CARDIOTHORACIC VASCULAR SURGERY)

## 2021-11-13 PROCEDURE — 90686 IIV4 VACC NO PRSV 0.5 ML IM: CPT | Performed by: THORACIC SURGERY (CARDIOTHORACIC VASCULAR SURGERY)

## 2021-11-13 PROCEDURE — 0 CEFAZOLIN IN DEXTROSE 2-4 GM/100ML-% SOLUTION: Performed by: PHYSICIAN ASSISTANT

## 2021-11-13 PROCEDURE — 25010000002 INFLUENZA VAC SPLIT QUAD 0.5 ML SUSPENSION PREFILLED SYRINGE: Performed by: THORACIC SURGERY (CARDIOTHORACIC VASCULAR SURGERY)

## 2021-11-13 RX ORDER — CLOPIDOGREL BISULFATE 75 MG/1
75 TABLET ORAL DAILY
Qty: 30 TABLET | Refills: 6 | Status: SHIPPED | OUTPATIENT
Start: 2021-11-14

## 2021-11-13 RX ORDER — HYDROCODONE BITARTRATE AND ACETAMINOPHEN 7.5; 325 MG/1; MG/1
1 TABLET ORAL EVERY 4 HOURS PRN
Qty: 30 TABLET | Refills: 0 | Status: SHIPPED | OUTPATIENT
Start: 2021-11-13 | End: 2021-11-13

## 2021-11-13 RX ORDER — HYDROCODONE BITARTRATE AND ACETAMINOPHEN 7.5; 325 MG/1; MG/1
1 TABLET ORAL EVERY 4 HOURS PRN
Qty: 30 TABLET | Refills: 0 | Status: SHIPPED | OUTPATIENT
Start: 2021-11-13 | End: 2021-11-22

## 2021-11-13 RX ADMIN — ASPIRIN 81 MG: 81 TABLET, COATED ORAL at 09:26

## 2021-11-13 RX ADMIN — CLOPIDOGREL BISULFATE 75 MG: 75 TABLET ORAL at 09:26

## 2021-11-13 RX ADMIN — SODIUM CHLORIDE 75 ML/HR: 4.5 INJECTION, SOLUTION INTRAVENOUS at 05:25

## 2021-11-13 RX ADMIN — HYDROCODONE BITARTRATE AND ACETAMINOPHEN 1 TABLET: 7.5; 325 TABLET ORAL at 12:51

## 2021-11-13 RX ADMIN — CEFAZOLIN SODIUM 2 G: 2 INJECTION, SOLUTION INTRAVENOUS at 00:12

## 2021-11-13 RX ADMIN — IPRATROPIUM BROMIDE AND ALBUTEROL SULFATE 3 ML: 2.5; .5 SOLUTION RESPIRATORY (INHALATION) at 12:54

## 2021-11-13 RX ADMIN — SODIUM CHLORIDE 5 MG/HR: 9 INJECTION, SOLUTION INTRAVENOUS at 00:47

## 2021-11-13 RX ADMIN — SODIUM CHLORIDE 7.5 MG/HR: 9 INJECTION, SOLUTION INTRAVENOUS at 04:19

## 2021-11-13 RX ADMIN — HYDROCODONE BITARTRATE AND ACETAMINOPHEN 1 TABLET: 7.5; 325 TABLET ORAL at 06:18

## 2021-11-13 RX ADMIN — INFLUENZA VIRUS VACCINE 0.5 ML: 15; 15; 15; 15 SUSPENSION INTRAMUSCULAR at 13:55

## 2021-11-13 RX ADMIN — IPRATROPIUM BROMIDE AND ALBUTEROL SULFATE 3 ML: 2.5; .5 SOLUTION RESPIRATORY (INHALATION) at 07:50

## 2021-11-13 NOTE — PROGRESS NOTES
INTENSIVIST   HOSPITAL VISIT NOTE     Hospital:  LOS: 1 day        СЕРГЕЙ Rudd, 61 y.o. female is followed for:No chief complaint on file.       Left subclavian artery occlusion    Anxiety and depression    Tobacco use    Dyslipidemia    As an Intensivist, we provide an integrated approach to the ICU patient and family, medical management of comorbid conditions, including but not limited to electrolytes, glycemic control, organ dysfunction, lead interdisciplinary rounds and coordinate the care with all other services, including those from other specialists.     Interval History:  POD: 1 Day Post-Op     Doing well.  Off intravenous infusions    The patient qualifies to receive the vaccine, but they have not yet received it.      The patient's relevant past medical, surgical and social history were reviewed and updated in Epic as appropriate.        History     Last Reviewed by Lorne Denise MD on 11/12/2021 at  3:35 PM    Sections Reviewed    Medical, Family, Surgical, Tobacco, Alcohol, Drug Use, Sexual Activity,   Social Documentation      Problem list reviewed by Lorne Denise MD on 11/12/2021 at  3:35 PM  Medicines reviewed by Lorne Denise MD on 11/12/2021 at  3:35 PM  Allergies reviewed by Lorne Denise MD on 11/12/2021 at  3:35 PM         O     Vitals:  Temp: 97.7 °F (36.5 °C) (11/13/21 0800) Temp  Min: 97.4 °F (36.3 °C)  Max: 98 °F (36.7 °C)   Temp core:      BP: 124/64 (11/13/21 1200) BP  Min: 100/60  Max: 137/71   Pulse: 93 (11/13/21 1254) Pulse  Min: 65  Max: 98   Resp: 20 (11/13/21 1254) Resp  Min: 16  Max: 20   SpO2: 90 % (11/13/21 1254) SpO2  Min: 89 %  Max: 97 %   Device: room air (11/13/21 1254)    Flow Rate: 2 (11/13/21 0800) Flow (L/min)  Min: 2  Max: 3     Intake/Ouptut 24 hrs (7:00AM - 6:59 AM)    Intake/Output Summary (Last 24 hours) at 11/13/2021 1311  Last data filed at 11/13/2021 1200  Gross per 24 hour   Intake 2473 ml   Output 2250 ml   Net 223 ml        Medications  (drips):  niCARdipine, Last Rate: Stopped (11/13/21 0923)  sodium chloride, Last Rate: Stopped (11/13/21 0930)      Physical Examination    Telemetry:  Rhythm: normal sinus rhythm (11/13/21 1200)         Constitutional:  No acute distress.   Head: Normocephalic.   ENMT: No oral lesions.   Neck: No adenopathy. Supple.  Trachea midline.   Cardiovascular: RRR.   Normal heart sounds.  No murmurs, gallop or rub.   Respiratory: Normal breath sounds  No adventitious sounds.   Abdominal:  Soft with no tenderness.  No distension.   No HSM.   Extremities: Warm.  Dry.  No cyanosis.  No Edema   Neurological/Psych:   Alert, Oriented, Cooperative.  Best Eye Response: 4-->(E4) spontaneous (11/13/21 0800)  Best Motor Response: 6-->(M6) obeys commands (11/13/21 0800)  Best Verbal Response: 5-->(V5) oriented (11/13/21 0800)  Elsie Coma Scale Score: 15 (11/13/21 0800)     Results Reviewed:  Laboratory  Microbiology  Radiology  Pathology    Hematology:  Results from last 7 days   Lab Units 11/13/21  0355 11/10/21  1409   WBC 10*3/mm3 14.91* 7.62   HEMOGLOBIN g/dL 14.6 13.8   PLATELETS 10*3/mm3 370 292   NEUTROS ABS 10*3/mm3 12.65*  --    LYMPHS ABS 10*3/mm3 1.35  --      Chemistry:  Estimated Creatinine Clearance: 110 mL/min (A) (by C-G formula based on SCr of 0.48 mg/dL (L)).  Results from last 7 days   Lab Units 11/13/21  0355 11/10/21  1409   SODIUM mmol/L 133* 137   POTASSIUM mmol/L 3.7 4.6   CHLORIDE mmol/L 98 103   CO2 mmol/L 23.0 24.0   BUN mg/dL 13 13   CREATININE mg/dL 0.48* 0.50*   GLUCOSE mg/dL 146* 102*     Results from last 7 days   Lab Units 11/13/21 0355 11/10/21  1409   CALCIUM mg/dL 8.8 9.0     COVID-19  Lab Results   Lab Value Date/Time    COVID19 Not Detected 11/10/2021 1409     Images:  EEG Monitoring Nonintracranial Surgery    Result Date: 11/12/2021  Uneventful EEG monitoring during left CEA This report is transcribed using the Dragon dictation system.      EEG (Pre-Op)    Result Date: 11/12/2021  This study is  adequate for intraoperative monitoring This report is transcribed using the Dragon dictation system.      Echo:  Results for orders placed during the hospital encounter of 09/22/21    Adult Transthoracic Echo Complete W/ Cont if Necessary Per Protocol    Interpretation Summary  · Left ventricular wall thickness is consistent with mild concentric hypertrophy.  · Calculated left ventricular 3D EF = 56% Left ventricular ejection fraction appears to be 61 - 65%. Left ventricular systolic function is normal.  · Left ventricular diastolic function is consistent with (grade I) impaired relaxation.  · Estimated right ventricular systolic pressure from tricuspid regurgitation is normal (<35 mmHg).      Results: Reviewed.  I reviewed the patient's new laboratory and imaging results.  I independently reviewed the patient's new images.    Medications: Reviewed.    Assessment/Plan   A / P     Blaire, is a 61 y.o. female admitted on 11/12/2021 with Left subclavian artery occlusion [I70.8]:     1. Left Subclavian artery occlusion  Procedure(s) (LRB):  CAROTID SUBCLAVIAN BYPASS LEFT (N/A)   Dr. Leiva  11/12/21  2. Dyslipidemia on statin  3. COPD no exacerbation  1. Tobacco use  4. Anxiety and Depression  5. Antibiotics: Cefuroxime periop.  6. No h/o DM    Results from last 7 days   Lab Units 11/12/21  1407   GLUCOSE mg/dL 130     Lab Results   Lab Value Date/Time    HGBA1C 5.20 11/10/2021 1409       Advance Directives: There are no questions and answers to display.        Plan:    1. Excellent post-op course.  2. Leukocytosis, probably reactive.  3. Disposition: Discharge Home as per CT surgery.     Plan of care and goals reviewed during interdisciplinary rounds.  I discussed the patient's findings and my recommendations with patient and nursing staff    Level of Risk is High due to:  illness with threat to life or bodily function.     []  Primary Attending  [x]  Consultant

## 2021-11-13 NOTE — PROGRESS NOTES
Cardiothoracic Surgery Progress Note      POD #1 s/p left carotid subclavian bypass       LOS: 1 day      Subjective:  No pain or neurologic changes arm feels good and wants to go home      Objective:  Vital Signs  Temp:  [97.1 °F (36.2 °C)-98 °F (36.7 °C)] 97.7 °F (36.5 °C)  Heart Rate:  [65-98] 81  Resp:  [14-20] 18  BP: (100-138)/(52-92) 104/59  Arterial Line BP: ()/(27-82) 104/82      Output by Drain (mL) 11/12/21 0701 - 11/12/21 1900 11/12/21 1901 - 11/13/21 0700 11/13/21 0701 - 11/13/21 0935 Range Total   Patient has no LDAs of requested type attached.        Physical Exam:   General Appearance: alert, appears stated age and cooperative   Lungs: clear to auscultation, respirations regular, respirations even and respirations unlabored   Heart: regular rhythm & normal rate, normal S1, S2, no murmur, no gallop, no rub and no click   Skin: Incision c/d/i     Results:  Results from last 7 days   Lab Units 11/13/21  0355   WBC 10*3/mm3 14.91*   HEMOGLOBIN g/dL 14.6   HEMATOCRIT % 43.7   PLATELETS 10*3/mm3 370     Results from last 7 days   Lab Units 11/13/21  0355   SODIUM mmol/L 133*   POTASSIUM mmol/L 3.7   CHLORIDE mmol/L 98   CO2 mmol/L 23.0   BUN mg/dL 13   CREATININE mg/dL 0.48*   GLUCOSE mg/dL 146*   CALCIUM mg/dL 8.8         Assessment:    L Carotid-Subclavian bypass 11/12/21    Anxiety and depression    Tobacco use    Dyslipidemia        Postop day 1 left carotid subclavian bypass    Plan:  Doing well liberalize blood pressure targets to 140  Discontinue nicardipine  Home with nanci Hamm MD  11/13/21  09:34 EST

## 2021-11-13 NOTE — DISCHARGE INSTR - ACTIVITY
Do not lift anything over 10 lbs. Wear your sling at all times. No driving until your followup appointment.    You may shower. Do not take a bath. Do not submerge your incision. Allow water to run over it, pat dry. Do not scrub.

## 2021-11-13 NOTE — PLAN OF CARE
Goal Outcome Evaluation:         Post Op 11/12 Left Carotid Subclavian Bypass:    Pt is A&O 4/4. Radial pulses equal bilaterally. O2 remains at 3L with sats 92-97%. Pt was unable to void on own; bladder scanner read 999 mL; in & out cath approx 1200 mL. No BM. Nicardipine drip restarted on 5 mcg/hr to maintain SBP <120. Pt remains afebrile and pain well managed and tolerated.     Will review AM labs once returned.

## 2021-11-14 NOTE — OUTREACH NOTE
Prep Survey      Responses   [facility] facility patient discharged from? [place]   Is LACE score < 7 ? No   Emergency Room discharge w/ pulse ox? No   Eligibility Readm Mgmt   Discharge diagnosis 1.Left Subclavian artery occlusionCAROTID SUBCLAVIAN BYPASS LEFT    Does the patient have one of the following disease processes/diagnoses(primary or secondary)? Cardiothoracic surgery   Does the patient have Home health ordered? No   Is there a DME ordered? No   Prep survey completed? Yes          Joya Eisenberg RN

## 2021-11-15 LAB
CYTO UR: NORMAL
LAB AP CASE REPORT: NORMAL
LAB AP CLINICAL INFORMATION: NORMAL
PATH REPORT.FINAL DX SPEC: NORMAL
PATH REPORT.GROSS SPEC: NORMAL

## 2021-11-16 ENCOUNTER — READMISSION MANAGEMENT (OUTPATIENT)
Dept: CALL CENTER | Facility: HOSPITAL | Age: 61
End: 2021-11-16

## 2021-11-16 NOTE — DISCHARGE SUMMARY
CTS Discharge Summary    Patient Care Team:  Sylvain Lujan MD as PCP - General (Family Medicine)  Consults:   Consults     No orders found from 10/14/2021 to 11/13/2021.          Date of Admission: 11/12/2021  5:40 AM  Date of Discharge:  11/13/2021    Discharge Diagnosis  Past Medical History:   Diagnosis Date   • Arthritis    • Asthma    • Bronchitis    • COPD (chronic obstructive pulmonary disease) (HCC)    • Fall    • Hyperlipidemia    • Subclavian artery stenosis (HCC)    • Subclavian steal syndrome of left subclavian artery    • Wears dentures    • Wears glasses      Left subclavian artery occlusion [I70.8]     Procedures Performed  Procedure(s):  CAROTID SUBCLAVIAN BYPASS LEFT       Operative Pathology:   Final Diagnosis   LYMPH NODE, LEFT SUPRACLAVICULAR, EXCISION:  Benign appearing lymph node negative for tumor and granulomata.       History of Present Illness  This patient is a 61-year-old female who was referred to me to evaluate left arm numbness.  She states that for the last 3 to 4 months she has had weakness in the left arm and significant numbness in the fingers.  Subsequently, a CT angiogram demonstrated an occluded left subclavian artery.  In the office today we were unable to obtain an adequate blood pressure in the left arm with a noninvasive blood pressure cuff.  She does have chronic obstructive pulmonary disease and has ongoing tobacco abuse.  She denies any anginal symptoms and was evaluated by her cardiologist who referred her to me for the left subclavian occlusion.    Hospital Course  Patient presented to the operating room on 11/12/2021 for Endarterectomy of the left common carotid artery.  Left carotid to subclavian bypass with 8 mm ringed PTFE graft with EEG monitoring. The patient was transported to recovery in stable condition where a neurological exam was performed and found to be comparable to baseline.     POD1 The patient met discharge criteria and was discharged to home        Discharge Medications     Discharge Medications      New Medications      Instructions Start Date   clopidogrel 75 MG tablet  Commonly known as: PLAVIX   75 mg, Oral, Daily      HYDROcodone-acetaminophen 7.5-325 MG per tablet  Commonly known as: NORCO   1 tablet, Oral, Every 4 Hours PRN         Continue These Medications      Instructions Start Date   alendronate 70 MG tablet  Commonly known as: FOSAMAX   70 mg, Oral, Every 7 Days      aspirin 81 MG EC tablet   81 mg, Oral, Daily      atorvastatin 40 MG tablet  Commonly known as: LIPITOR   40 mg, Oral, Nightly      vitamin B-12 100 MCG tablet  Commonly known as: CYANOCOBALAMIN   50 mcg, Oral, Daily             Discharge Diet:   Diet Instructions     Diet: Cardiac      Discharge Diet: Cardiac          Activity at Discharge:   Activity Instructions     Bathing Restrictions      Do not submerge incisions in bathtub, swimming pools, hot tub, or any body of water. You may shower and allow water to run over incisions, then pat dry.    Type of Restriction: Bathing    Bathing Restrictions: No Tub Bath    Driving Restrictions      Type of Restriction: Driving    Driving Restrictions: No Driving Until Next Appointment    Lifting Restrictions      Type of Restriction: Lifting    Lifting Restrictions: Lifting Restriction (Indicate Limit)    Weight Limit (Pounds): 10    Length of Lifting Restriction: until next appoinment   Additional Activity Instructions:    Do not lift anything over 10 lbs. Wear your sling at all times. No driving until your followup appointment.    You may shower. Do not take a bath. Do not submerge your incision. Allow water to run over it, pat dry. Do not scrub.           Follow-up Appointments  Future Appointments   Date Time Provider Department Center   12/6/2021  1:00 PM Jacqueline Schneider APRN [facility]   12/16/2021  2:45 PM Yi Dela Cruz APRN [facility]        WOUND CARE:  Monitor surgical wounds daily. Keep incisons clean and  dry.   Call CT Surgery office [phone]  with any questions or concerns, specifically let them know of increased redness, drainage, or opening up of incision.       Elaine Rosado PA-C  11/16/21  10:17 EST

## 2021-11-16 NOTE — OUTREACH NOTE
CT Surgery Week 1 Survey      Responses   [facility], [place], operated by [facility] patient discharged from? [place]   Does the patient have one of the following disease processes/diagnoses(primary or secondary)? Cardiothoracic surgery   Week 1 attempt successful? No   Unsuccessful attempts Attempt 1            Brenda Pozo RN

## 2021-11-17 ENCOUNTER — READMISSION MANAGEMENT (OUTPATIENT)
Dept: CALL CENTER | Facility: HOSPITAL | Age: 61
End: 2021-11-17

## 2021-11-17 NOTE — OUTREACH NOTE
CT Surgery Week 1 Survey      Responses   [facility] patient discharged from? [place]   Does the patient have one of the following disease processes/diagnoses(primary or secondary)? Cardiothoracic surgery   Week 1 attempt successful? No   Unsuccessful attempts Attempt 2            Breanna Goodman RN

## 2021-11-22 ENCOUNTER — READMISSION MANAGEMENT (OUTPATIENT)
Dept: CALL CENTER | Facility: HOSPITAL | Age: 61
End: 2021-11-22

## 2021-11-22 NOTE — OUTREACH NOTE
CT Surgery Week 1 Survey      Responses   [facility] patient discharged from? [place]   Does the patient have one of the following disease processes/diagnoses(primary or secondary)? Cardiothoracic surgery   Week 1 attempt successful? Yes   Call start time 1110   Call end time 1117   Discharge diagnosis Left Subclavian artery occlusionCAROTID SUBCLAVIAN BYPASS LEFT    Meds reviewed with patient/caregiver? Yes   Is the patient having any side effects they believe may be caused by any medication additions or changes? No   Does the patient have all medications related to this admission filled (includes all antibiotics, pain medications, cardiac medications, etc.) Yes   Is the patient taking all medications as directed (includes completed medication regime)? Yes   Comments regarding appointments Appt with cards is on 12/16/21   Does the patient have a primary care provider?  Yes   Does the patient have an appointment scheduled with their C/T surgeon? Yes  [12/6/21]   Comments regarding PCP Encouraged PCP appt    Has the patient kept scheduled appointments due by today? N/A   Psychosocial issues? No   Did the patient receive a copy of their discharge instructions? Yes   Nursing interventions Reviewed instructions with patient   What is the patient's perception of their health status since discharge? Improving   Is the patient /caregiver able to teach back basic post-op care? Drive as instructed by MD in discharge instructions,  Lifting as instructed by MD in discharge instructions,  No tub bath, swimming, or hot tub until instructed by MD,  Take showers only when approved by MD-sponge bathe until then   Is the patient/caregiver able to teach back signs and symptoms of incisional infection? Fever,  Increased drainage or bleeding,  Pus or odor from incision,  Increased redness, swelling or pain at the incisonal site,  Incisional warmth   Is the patient/caregiver able to teach back steps to recovery at home? Set  small, achievable goals for return to baseline health,  Rest and rebuild strength, gradually increase activity,  Eat a well-balance diet   If the patient is a current smoker, are they able to teach back resources for cessation? [id number],  Smoking cessation medications   Is the patient/caregiver able to teach back the hierarchy of who to call/visit for symptoms/problems? PCP, Specialist, Home health nurse, Urgent Care, ED, 911 Yes   Week 1 call completed? Yes            Yanni Sow RN

## 2021-11-23 DIAGNOSIS — I70.8 LEFT SUBCLAVIAN ARTERY OCCLUSION: Primary | ICD-10-CM

## 2021-11-23 RX ORDER — HYDROCODONE BITARTRATE AND ACETAMINOPHEN 5; 325 MG/1; MG/1
1 TABLET ORAL EVERY 8 HOURS PRN
Qty: 15 TABLET | Refills: 0 | Status: SHIPPED | OUTPATIENT
Start: 2021-11-23 | End: 2021-11-28

## 2021-12-01 ENCOUNTER — READMISSION MANAGEMENT (OUTPATIENT)
Dept: CALL CENTER | Facility: HOSPITAL | Age: 61
End: 2021-12-01

## 2021-12-01 NOTE — OUTREACH NOTE
CT Surgery Week 2 Survey      Responses   [facility] patient discharged from? [place]   Does the patient have one of the following disease processes/diagnoses(primary or secondary)? Cardiothoracic surgery   Week 2 attempt successful? Yes   Call start time 1030   Call end time 1031   Discharge diagnosis Left Subclavian artery occlusionCAROTID SUBCLAVIAN BYPASS LEFT    Is the patient taking all medications as directed (includes completed medication regime)? Yes   Comments regarding appointments will see cardiology on 12/16   Does the patient have a primary care provider?  Yes   Does the patient have an appointment scheduled with their C/T surgeon? Yes   Has the patient kept scheduled appointments due by today? N/A   Comments f/u with CT surgeon on 12/6   Has home health visited the patient within 72 hours of discharge? N/A   Psychosocial issues? No   What is the patient's perception of their health status since discharge? Improving   Nursing interventions Nurse provided patient education   Is the patient /caregiver able to teach back basic post-op care? No tub bath, swimming, or hot tub until instructed by MD,  Keep incision areas clean, dry and protected,  Lifting as instructed by MD in discharge instructions   Is the patient/caregiver able to teach back signs and symptoms of incisional infection? Fever,  Increased drainage or bleeding   Is the patient/caregiver able to teach back the hierarchy of who to call/visit for symptoms/problems? PCP, Specialist, Home health nurse, Urgent Care, ED, 911 Yes   Week 2 call completed? Yes   Wrap up additional comments Says she is doing well, no questions or concerns at this time.          Radha Benítez RN

## 2021-12-06 ENCOUNTER — OFFICE VISIT (OUTPATIENT)
Dept: CARDIAC SURGERY | Facility: CLINIC | Age: 61
End: 2021-12-06

## 2021-12-06 VITALS
HEIGHT: 62 IN | HEART RATE: 91 BPM | BODY MASS INDEX: 28.16 KG/M2 | TEMPERATURE: 97.3 F | SYSTOLIC BLOOD PRESSURE: 159 MMHG | DIASTOLIC BLOOD PRESSURE: 88 MMHG | WEIGHT: 153 LBS | OXYGEN SATURATION: 98 %

## 2021-12-06 DIAGNOSIS — I70.8 LEFT SUBCLAVIAN ARTERY OCCLUSION: Primary | ICD-10-CM

## 2021-12-06 PROCEDURE — 99024 POSTOP FOLLOW-UP VISIT: CPT | Performed by: NURSE PRACTITIONER

## 2021-12-06 RX ORDER — NICOTINE TRANSDERMAL SYSTEM 21 MG/24H
PATCH, EXTENDED RELEASE TRANSDERMAL
COMMUNITY
Start: 2021-11-23 | End: 2022-02-10

## 2021-12-06 NOTE — PROGRESS NOTES
[facility] Cardiothoracic Surgery Office Follow Up Note     Date of Encounter: 2021     Name: Blaire Mena  : 1960     Referred By: No ref. provider found  PCP: Sylvain Lujan MD    Chief Complaint:    Chief Complaint   Patient presents with   • Post-op Follow-up     hosp f/u s/p left subclavian bypass on  with ROM       Subjective      History of Present Illness:   Blaire Mena is a 61 y.o. 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 Leiva.  Patient was referred to Dr. Leiva for further evaluation of left arm weakness and finger numbness with CTA demonstrating occluded left subclavian artery.  Patient underwent endarterectomy of left common carotid artery, left carotid to subclavian bypass on 2021 with Dr. Leiva.  Patient had uncomplicated postoperative course and was discharged on POD #1 with no readmissions.  Presents today for postoperative follow-up. Pt denies hx of TIA or CVA since last visit, has had no new focal neurologic dysfunction, specifically vision changes or amaurosis fugax. She reports improvement in her LLE weakness. She has some left finger numbness that is present but reports she has had this for a long time and it is related to her cervical disc disease which she anticipates having surgery for in the next few months. She denies any issues with her incisions, fevers, or chills. She does continue to smoke daily. She has been compliant with DAPT.  She has scheduled follow-up with cardiology Dr. Duff's office on 2021.    Review of Systems:  Review of Systems   Constitutional: Negative for chills, decreased appetite, diaphoresis, fever, malaise/fatigue, night sweats, weight gain and weight loss.   HENT: Negative for hoarse voice.    Eyes: Negative for blurred vision, double vision and visual  disturbance.   Cardiovascular: Negative for chest pain, claudication, dyspnea on exertion, irregular heartbeat, leg swelling, near-syncope, orthopnea, palpitations, paroxysmal nocturnal dyspnea and syncope.   Respiratory: Negative for cough, hemoptysis, shortness of breath, sputum production and wheezing.    Hematologic/Lymphatic: Negative for adenopathy and bleeding problem. Does not bruise/bleed easily.   Skin: Negative for color change, nail changes, poor wound healing and rash.   Musculoskeletal: Negative for back pain, falls and muscle cramps.   Gastrointestinal: Negative for abdominal pain, dysphagia and heartburn.   Genitourinary: Negative for flank pain.   Neurological: Negative for brief paralysis, disturbances in coordination, dizziness, focal weakness, headaches, light-headedness, loss of balance, numbness, paresthesias, sensory change, vertigo and weakness.   Psychiatric/Behavioral: Negative for depression and suicidal ideas.   Allergic/Immunologic: Negative for persistent infections.       I have reviewed the following portions of the patient's history: allergies, current medications, past family history, past medical history, past social history, past surgical history, problem list and ROS and confirm it's accurate.    Allergies:  Allergies   Allergen Reactions   • Sulfa Antibiotics Swelling       Medications:      Current Outpatient Medications:   •  aspirin (aspirin) 81 MG EC tablet, Take 1 tablet by mouth Daily., Disp: 90 tablet, Rfl: 3  •  atorvastatin (LIPITOR) 40 MG tablet, Take 1 tablet by mouth Every Night., Disp: 30 tablet, Rfl: 11  •  clopidogrel (PLAVIX) 75 MG tablet, Take 1 tablet by mouth Daily., Disp: 30 tablet, Rfl: 6  •  GNP Nicotine 21 MG/24HR patch, , Disp: , Rfl:   •  vitamin B-12 (CYANOCOBALAMIN) 100 MCG tablet, Take 50 mcg by mouth Daily., Disp: , Rfl:   •  alendronate (FOSAMAX) 70 MG tablet, Take 70 mg by mouth Every 7 (Seven) Days., Disp: , Rfl:     History:   Past Medical  "History:   Diagnosis Date   • Arthritis    • Asthma    • Bronchitis    • COPD (chronic obstructive pulmonary disease) (HCC)    • Fall    • Hyperlipidemia    • Subclavian artery stenosis (HCC)    • Subclavian steal syndrome of left subclavian artery    • Wears dentures    • Wears glasses        Past Surgical History:   Procedure Laterality Date   • APPENDECTOMY     • CAROTID SUBCLAVIAN BYPASS N/A 11/12/2021    Procedure: CAROTID SUBCLAVIAN BYPASS LEFT;  Surgeon: Narinder Leiva MD;  Location: [facility];  Service: Cardiothoracic;  Laterality: N/A;   • FEMUR FRACTURE SURGERY     • FIBULA FRACTURE SURGERY     • HYSTERECTOMY     • KNEE SURGERY Bilateral    • SEPTOPLASTY     • TEETH EXTRACTION     • TIBIA FRACTURE SURGERY         Social History     Socioeconomic History   • Marital status:    • Number of children: 3   Tobacco Use   • Smoking status: Current Every Day Smoker     Packs/day: 2.00     Years: 41.00     Pack years: 82.00     Types: Cigarettes   • Smokeless tobacco: Never Used   Vaping Use   • Vaping Use: Never used   Substance and Sexual Activity   • Alcohol use: Never   • Drug use: Defer   • Sexual activity: Defer        Family History   Problem Relation Age of Onset   • Diabetes Mother    • Heart disease Mother    • Hypertension Mother    • Hyperlipidemia Mother    • Hypertension Father    • Heart disease Father    • Hyperlipidemia Father    • Aneurysm Father        Objective     Physical Exam:  Vitals:    12/06/21 1328 12/06/21 1329   BP: 131/79 159/88   BP Location: Right arm Left arm   Pulse: 91    Temp: 97.3 °F (36.3 °C)    SpO2: 98%    Weight: 69.4 kg (153 lb)    Height: 157.5 cm (62\")       Body mass index is 27.98 kg/m².    Physical Exam  Vitals and nursing note reviewed.   Constitutional:       Appearance: Normal appearance. She is well-developed.   HENT:      Head: Normocephalic and atraumatic.      Mouth/Throat:      Tongue: Tongue does not deviate from midline.   Eyes:      Pupils: " Pupils are equal, round, and reactive to light.   Neck:      Vascular: No carotid bruit.   Cardiovascular:      Rate and Rhythm: Normal rate and regular rhythm.      Pulses: Normal pulses.           Carotid pulses are 2+ on the right side and 2+ on the left side.       Radial pulses are 2+ on the right side and 2+ on the left side.      Heart sounds: Normal heart sounds, S1 normal and S2 normal. No murmur heard.       Comments: Subclavian incision: dopplerable pulses present throughout bypass site  Pulmonary:      Effort: Pulmonary effort is normal.      Breath sounds: Normal breath sounds.   Abdominal:      Palpations: Abdomen is soft.   Musculoskeletal:         General: No swelling.      Cervical back: Neck supple.      Right lower leg: No edema.      Left lower leg: No edema.   Skin:     General: Skin is warm and dry.      Capillary Refill: Capillary refill takes less than 2 seconds.      Findings: No bruising.      Comments: Subclavian incision: Well healing, well-approximated, no evidence of dehiscence, drainage, erythema or warmth   Neurological:      General: No focal deficit present.      Mental Status: She is alert and oriented to person, place, and time. Mental status is at baseline.      GCS: GCS eye subscore is 4. GCS verbal subscore is 5. GCS motor subscore is 6.      Motor: Motor function is intact.      Coordination: Coordination is intact.      Gait: Gait is intact.   Psychiatric:         Mood and Affect: Mood normal.         Speech: Speech normal.         Behavior: Behavior normal. Behavior is cooperative.         Cognition and Memory: Cognition normal.         Imaging/Labs:    EEG Monitoring Nonintracranial Surgery    Result Date: 11/12/2021  Uneventful EEG monitoring during left CEA This report is transcribed using the Dragon dictation system.      EEG (Pre-Op)    Result Date: 11/12/2021  This study is adequate for intraoperative monitoring This report is transcribed using the Dragon dictation  system.      CTA neck with/without contrast performed at [facility] Result date 10/18/2021:  Right carotid system: Stenosis right internal carotid artery estimated at less than  Left carotid system: Stenosis left internal carotid artery estimated at less than 50%.  Vertebral system moderate atherosclerosis right vertebral artery which is patent.  Left vertebral artery appears to occlude proximally and there is segmental occlusion of the vessel throughout the neck with several areas of patency with contrast noted within the vessel at several points likely due to retrograde collateral flow.  Other: Subclavian artery is densely calcified proximally and likely occluded or narrowed due to critical stenosis.    Results for orders placed during the hospital encounter of 09/22/21    Duplex Carotid Ultrasound CAR    Interpretation Summary  1.  The right common carotid artery is widely patent.  There is calcified plaque in the distal left common carotid artery which is discrete, concentric, and results in 16 to 49% stenosis by Doppler.  Diameter stenosis on echo is approximately 50%.    2.  The bulb and bifurcation are grossly widely patent bilaterally.    3.  16 to 49% stenosis by Doppler in both internal carotid arteries.    4.  Antegrade flow in the right vertebral artery.  There is minimal flow in the left vertebral artery.  There also appears to be reversal of flow in the left subclavian.    Summary: No obstructive carotid disease on either side.  Antegrade vertebral flow on the right, minimal flow on the left.  Reversal of flow in the left subclavian artery.      Assessment / Plan      Assessment / Plan:  Diagnoses and all orders for this visit:    1. L Carotid-Subclavian bypass 11/12/21 (Primary)       1.  Left subclavian artery stenosis with subclavian steal syndrome s/p left CEA carotid to subclavian bypass on 11/12/2021 with Dr. Leiva: Patient progressing well from a postoperative  standpoint, denies any acute complaints today.  Patient has no tongue deviation, asymmetrical smile, hoarseness or voice quality change, or evidence of Marshall syndrome. She has had improvement in her left upper extremity weakness, but reports residual left last 3 finger numbness that is related to her cervical disc disease which she plans to have surgery for in the next few months, follows with Dr. Leblanc.  Her subclavian incision is well-healing without any evidence of dehiscence, erythema, drainage, warmth.  Patient has good palpable pulses throughout her left upper extremity and blood pressure was obtained in BUE today.  Patient has been compliant with DAPT therapy, but unfortunately continues to smoke 2 PPD and is unwilling to quit at this time.  Discussed risks of continued smoking in regards to her bypass and vascular disease. She follows closely with cardiology Dr. Duff's office in [place] and will continue annual carotid duplex surveillance with their office. Of note, patient had concerns for vertebral artery stenosis and was supposed to have follow up with Dr. Reed's office but this was cancelled.  Encouraged patient to contact our office for concerns for worsening LE weakness, numbness, discoloration, pain, non-healing wounds to LUE. Will plan to see patient back on an as needed basis.      Patient Education:  Blaire Mena  reports that she has been smoking cigarettes. She has a 82.00 pack-year smoking history. She has never used smokeless tobacco.. I have educated her on the risk of diseases from using tobacco products such as cancer, COPD and heart disease.     I advised her to quit and she is not willing to quit.    I spent 6 minutes counseling the patient.           Follow Up:   Return if symptoms worsen or fail to improve.   Or sooner for any further concerns or worsening sign and symptoms. If unable to reach us in the office please dial 911 or go to the nearest emergency  department.      Jacqueline Schneider APRNADEGE  [facility] Cardiothoracic Surgery

## 2021-12-08 ENCOUNTER — READMISSION MANAGEMENT (OUTPATIENT)
Dept: CALL CENTER | Facility: HOSPITAL | Age: 61
End: 2021-12-08

## 2021-12-08 NOTE — OUTREACH NOTE
CT Surgery Week 3 Survey      Responses   [facility] patient discharged from? [facility]   Does the patient have one of the following disease processes/diagnoses(primary or secondary)? Cardiothoracic surgery   Week 3 attempt successful? Yes   Call start time 1520   Call end time 1523   Meds reviewed with patient/caregiver? Yes   Is the patient taking all medications as directed (includes completed medication regime)? Yes   Comments regarding appointments Pt saw surgeon on December 6, 2021 and has been released.   Week 3 call completed? Yes   Wrap up additional comments Pt reports feeling quite well. Pt has been released by surgeon and may resume all activities.          Jessika Walker, DENISSE

## 2021-12-16 ENCOUNTER — READMISSION MANAGEMENT (OUTPATIENT)
Dept: CALL CENTER | Facility: HOSPITAL | Age: 61
End: 2021-12-16

## 2021-12-16 NOTE — OUTREACH NOTE
CT Surgery Week 4 Survey      Responses   [facility] [place] operated by [facility] patient discharged from? [place]   Does the patient have one of the following disease processes/diagnoses(primary or secondary)? Cardiothoracic surgery   Week 4 attempt successful? Yes   Call start time 1617   Call end time 1618   Discharge diagnosis Left Subclavian artery occlusionCAROTID SUBCLAVIAN BYPASS LEFT    Meds reviewed with patient/caregiver? Yes   Is the patient having any side effects they believe may be caused by any medication additions or changes? No   Is the patient taking all medications as directed (includes completed medication regime)? Yes   Has the patient kept scheduled appointments due by today? No   What is preventing the patient from keeping their appointments? Transportation   Nursing Interventions Advised to reschedule appointment   Is the patient still receiving Home Health Services? N/A   Psychosocial issues? No   What is the patient's perception of their health status since discharge? Improving   Nursing interventions Nurse provided patient education   Is the patient/caregiver able to teach back normal signs of recovery? Pain or discomfort at incisional site,  Nausea and lack of appetite   Nursing interventions Reassured on normal signs of recovery   Is the patient/caregiver able to teach back steps to recovery at home? Set small, achievable goals for return to baseline health,  Rest and rebuild strength, gradually increase activity,  Eat a well-balance diet   Is the patient/caregiver able to teach back the hierarchy of who to call/visit for symptoms/problems? PCP, Specialist, Home health nurse, Urgent Care, ED, 911 Yes   Week 4 Call Completed? Yes   Would the patient like one additional call? No   Graduated Yes   Is the patient interested in additional calls from an ambulatory ?  NOTE:  applies to high risk patients requiring additional follow-up. No   Did the patient feel the follow up calls were helpful during  their recovery period? Yes   Was the number of calls appropriate? Yes          Jovita Goodman RN

## 2022-02-10 ENCOUNTER — OFFICE VISIT (OUTPATIENT)
Dept: CARDIOLOGY | Facility: CLINIC | Age: 62
End: 2022-02-10

## 2022-02-10 ENCOUNTER — LAB (OUTPATIENT)
Dept: CARDIOLOGY | Facility: CLINIC | Age: 62
End: 2022-02-10

## 2022-02-10 VITALS
BODY MASS INDEX: 27.86 KG/M2 | OXYGEN SATURATION: 96 % | WEIGHT: 151.4 LBS | SYSTOLIC BLOOD PRESSURE: 133 MMHG | HEART RATE: 83 BPM | HEIGHT: 62 IN | TEMPERATURE: 97.3 F | DIASTOLIC BLOOD PRESSURE: 72 MMHG

## 2022-02-10 DIAGNOSIS — I51.89 GRADE I DIASTOLIC DYSFUNCTION: ICD-10-CM

## 2022-02-10 DIAGNOSIS — I70.8 LEFT SUBCLAVIAN ARTERY OCCLUSION: ICD-10-CM

## 2022-02-10 DIAGNOSIS — E78.5 DYSLIPIDEMIA: Chronic | ICD-10-CM

## 2022-02-10 DIAGNOSIS — R06.02 SHORTNESS OF BREATH: ICD-10-CM

## 2022-02-10 DIAGNOSIS — R60.9 PERIPHERAL EDEMA: ICD-10-CM

## 2022-02-10 DIAGNOSIS — G45.8 SUBCLAVIAN STEAL SYNDROME OF LEFT SUBCLAVIAN ARTERY: Primary | ICD-10-CM

## 2022-02-10 DIAGNOSIS — Z72.0 TOBACCO USE: Chronic | ICD-10-CM

## 2022-02-10 DIAGNOSIS — Z98.890 HISTORY OF CAROTID ENDARTERECTOMY: ICD-10-CM

## 2022-02-10 DIAGNOSIS — G45.8 SUBCLAVIAN STEAL SYNDROME OF LEFT SUBCLAVIAN ARTERY: ICD-10-CM

## 2022-02-10 LAB
ALBUMIN SERPL-MCNC: 3.99 G/DL (ref 3.5–5.2)
ALBUMIN/GLOB SERPL: 1.3 G/DL
ALP SERPL-CCNC: 93 U/L (ref 39–117)
ALT SERPL W P-5'-P-CCNC: 14 U/L (ref 1–33)
ANION GAP SERPL CALCULATED.3IONS-SCNC: 13.8 MMOL/L (ref 5–15)
AST SERPL-CCNC: 31 U/L (ref 1–32)
BILIRUB SERPL-MCNC: 0.3 MG/DL (ref 0–1.2)
BUN SERPL-MCNC: 16 MG/DL (ref 8–23)
BUN/CREAT SERPL: 18 (ref 7–25)
CALCIUM SPEC-SCNC: 9 MG/DL (ref 8.6–10.5)
CHLORIDE SERPL-SCNC: 100 MMOL/L (ref 98–107)
CHOLEST SERPL-MCNC: 205 MG/DL (ref 0–200)
CO2 SERPL-SCNC: 23.2 MMOL/L (ref 22–29)
CREAT SERPL-MCNC: 0.89 MG/DL (ref 0.57–1)
GFR SERPL CREATININE-BSD FRML MDRD: 64 ML/MIN/1.73
GLOBULIN UR ELPH-MCNC: 3 GM/DL
GLUCOSE SERPL-MCNC: 108 MG/DL (ref 65–99)
HDLC SERPL-MCNC: 45 MG/DL (ref 40–60)
LDLC SERPL CALC-MCNC: 139 MG/DL (ref 0–100)
LDLC/HDLC SERPL: 3.03 {RATIO}
POTASSIUM SERPL-SCNC: 4.1 MMOL/L (ref 3.5–5.2)
PROT SERPL-MCNC: 7 G/DL (ref 6–8.5)
SODIUM SERPL-SCNC: 137 MMOL/L (ref 136–145)
TRIGL SERPL-MCNC: 119 MG/DL (ref 0–150)
VLDLC SERPL-MCNC: 21 MG/DL (ref 5–40)

## 2022-02-10 PROCEDURE — 80053 COMPREHEN METABOLIC PANEL: CPT | Performed by: NURSE PRACTITIONER

## 2022-02-10 PROCEDURE — 99214 OFFICE O/P EST MOD 30 MIN: CPT | Performed by: NURSE PRACTITIONER

## 2022-02-10 PROCEDURE — 80061 LIPID PANEL: CPT | Performed by: NURSE PRACTITIONER

## 2022-02-10 PROCEDURE — 36415 COLL VENOUS BLD VENIPUNCTURE: CPT

## 2022-02-10 RX ORDER — ERGOCALCIFEROL 1.25 MG/1
50000 CAPSULE ORAL WEEKLY
COMMUNITY

## 2022-02-10 RX ORDER — AMOXICILLIN AND CLAVULANATE POTASSIUM 875; 125 MG/1; MG/1
1 TABLET, FILM COATED ORAL 2 TIMES DAILY
COMMUNITY

## 2022-02-10 NOTE — PATIENT INSTRUCTIONS
"High Cholesterol    High cholesterol is a condition in which the blood has high levels of a white, waxy substance similar to fat (cholesterol). The liver makes all the cholesterol that the body needs. The human body needs small amounts of cholesterol to help build cells. A person gets extra or excess cholesterol from the food that he or she eats.  The blood carries cholesterol from the liver to the rest of the body. If you have high cholesterol, deposits (plaques) may build up on the walls of your arteries. Arteries are the blood vessels that carry blood away from your heart. These plaques make the arteries narrow and stiff.  Cholesterol plaques increase your risk for heart attack and stroke. Work with your health care provider to keep your cholesterol levels in a healthy range.  What increases the risk?  The following factors may make you more likely to develop this condition:  · Eating foods that are high in animal fat (saturated fat) or cholesterol.  · Being overweight.  · Not getting enough exercise.  · A family history of high cholesterol (familial hypercholesterolemia).  · Use of tobacco products.  · Having diabetes.  What are the signs or symptoms?  There are no symptoms of this condition.  How is this diagnosed?  This condition may be diagnosed based on the results of a blood test.  · If you are older than 20 years of age, your health care provider may check your cholesterol levels every 4-6 years.  · You may be checked more often if you have high cholesterol or other risk factors for heart disease.  The blood test for cholesterol measures:  · \"Bad\" cholesterol, or LDL cholesterol. This is the main type of cholesterol that causes heart disease. The desired level is less than 100 mg/dL.  · \"Good\" cholesterol, or HDL cholesterol. HDL helps protect against heart disease by cleaning the arteries and carrying the LDL to the liver for processing. The desired level for HDL is 60 mg/dL or higher.  · Triglycerides. " These are fats that your body can store or burn for energy. The desired level is less than 150 mg/dL.  · Total cholesterol. This measures the total amount of cholesterol in your blood and includes LDL, HDL, and triglycerides. The desired level is less than 200 mg/dL.  How is this treated?  This condition may be treated with:  · Diet changes. You may be asked to eat foods that have more fiber and less saturated fats or added sugar.  · Lifestyle changes. These may include regular exercise, maintaining a healthy weight, and quitting use of tobacco products.  · Medicines. These are given when diet and lifestyle changes have not worked. You may be prescribed a statin medicine to help lower your cholesterol levels.  Follow these instructions at home:  Eating and drinking    · Eat a healthy, balanced diet. This diet includes:  ? Daily servings of a variety of fresh, frozen, or canned fruits and vegetables.  ? Daily servings of whole grain foods that are rich in fiber.  ? Foods that are low in saturated fats and trans fats. These include poultry and fish without skin, lean cuts of meat, and low-fat dairy products.  ? A variety of fish, especially oily fish that contain omega-3 fatty acids. Aim to eat fish at least 2 times a week.  · Avoid foods and drinks that have added sugar.  · Use healthy cooking methods, such as roasting, grilling, broiling, baking, poaching, steaming, and stir-frying. Do not leung your food except for stir-frying.    Lifestyle    · Get regular exercise. Aim to exercise for a total of 150 minutes a week. Increase your activity level by doing activities such as gardening, walking, and taking the stairs.  · Do not use any products that contain nicotine or tobacco, such as cigarettes, e-cigarettes, and chewing tobacco. If you need help quitting, ask your health care provider.    General instructions  · Take over-the-counter and prescription medicines only as told by your health care provider.  · Keep all  "follow-up visits as told by your health care provider. This is important.  Where to find more information  · American Heart Association: www.heart.org  · National Heart, Lung, and Blood [place]: www.nhlbi.nih.gov  Contact a health care provider if:  · You have trouble achieving or maintaining a healthy diet or weight.  · You are starting an exercise program.  · You are unable to stop smoking.  Get help right away if:  · You have chest pain.  · You have trouble breathing.  · You have any symptoms of a stroke. \"BE FAST\" is an easy way to remember the main warning signs of a stroke:  ? B - Balance. Signs are dizziness, sudden trouble walking, or loss of balance.  ? E - Eyes. Signs are trouble seeing or a sudden change in vision.  ? F - Face. Signs are sudden weakness or numbness of the face, or the face or eyelid drooping on one side.  ? A - Arms. Signs are weakness or numbness in an arm. This happens suddenly and usually on one side of the body.  ? S - Speech. Signs are sudden trouble speaking, slurred speech, or trouble understanding what people say.  ? T - Time. Time to call emergency services. Write down what time symptoms started.  · You have other signs of a stroke, such as:  ? A sudden, severe headache with no known cause.  ? Nausea or vomiting.  ? Seizure.  These symptoms may represent a serious problem that is an emergency. Do not wait to see if the symptoms will go away. Get medical help right away. Call your local emergency services (911 in the [place] Do not drive yourself to the hospital.  Summary  · Cholesterol plaques increase your risk for heart attack and stroke. Work with your health care provider to keep your cholesterol levels in a healthy range.  · Eat a healthy, balanced diet, get regular exercise, and maintain a healthy weight.  · Do not use any products that contain nicotine or tobacco, such as cigarettes, e-cigarettes, and chewing tobacco.  · Get help right away if you have any symptoms of a " stroke.  This information is not intended to replace advice given to you by your health care provider. Make sure you discuss any questions you have with your health care provider.  Document Revised: 11/16/2020 Document Reviewed: 11/16/2020  Elsevier Patient Education © 2021 [employer]

## 2022-02-10 NOTE — PROGRESS NOTES
Subjective   Blaire Mena is a 61 y.o. female     Chief Complaint   Patient presents with   • Follow-up       HPI    Problem List:    1.  Subclavian steal syndrome left subclavian artery  1.1 carotid artery ultrasound 9/22/2021-16 to 49% stenosis in distal left common carotid artery, diameter stenosis on echo was 50%, 16 for 9% stenosis both internal carotid arteries, antegrade flow right vertebral artery, minimal flow to the left vertebral artery.  There also appears to be a result of the flow of the left subclavian  1.2 CT of the neck 10/18/2021-left vertebral artery appears to be occluded proximally and there is a segment of occlusion of the vessel throughout the neck with several areas of patency with contrast noted within the vessel at several points likely due to retrograde collateral flow; subclavian arteries densely calcified proximally likely occluded or narrowed due to critical stenosis  1.3 left carotid to subclavian bypass 11/12/2021, Dr. Leiva  2.  Bilateral carotid bruits  3.  Shortness of breath  3.1 echo 9/22/2021-EF 61 to 65%, diastolic dysfunction 1, mild LVH  4.  COPD  5.  Neck pain  6.  Anxiety and depression  7.  Smoking habituation  8.  Stress test 9/22/2021-small fixed apical wall defect more prominent at the rest images consistent with small apical infarct versus breast attenuation artifact, EF 57%, consistent with low risk study  9.  Carotid artery disease  9.1 CTA of the neck 10/18/2021-no evidence of hemodynamic significant carotid artery disease  9.2 endarterectomy of left common carotid artery 11/12/2021, Dr. Leiva    Patient is a 61-year-old female who presents today for follow-up on testing.  She denies any chest pain, pressure, palpitations, fluttering, dizziness, presyncope, syncope, orthopnea, PND or edema.  Patient denies any shortness of breath with activity.  She says overall she feels really good except for her neck problems which she is seeing someone at [facility] in  [place] for that as well.    We went over stress, echo, carotid artery ultrasound and CTA of the neck.  We also went over the surgery she had with Dr. Leiva.      Current Outpatient Medications on File Prior to Visit   Medication Sig Dispense Refill   • alendronate (FOSAMAX) 70 MG tablet Take 70 mg by mouth Every 7 (Seven) Days.     • amoxicillin-clavulanate (AUGMENTIN) 875-125 MG per tablet Take 1 tablet by mouth 2 (Two) Times a Day.     • aspirin (aspirin) 81 MG EC tablet Take 1 tablet by mouth Daily. 90 tablet 3   • clopidogrel (PLAVIX) 75 MG tablet Take 1 tablet by mouth Daily. 30 tablet 6   • fluticasone-salmeterol (Advair Diskus) 250-50 MCG/DOSE DISKUS Inhale 2 (Two) Times a Day.     • ipratropium-albuterol (COMBIVENT RESPIMAT)  MCG/ACT inhaler Inhale 1 puff 4 (Four) Times a Day As Needed for Wheezing.     • tiotropium (SPIRIVA) 18 MCG per inhalation capsule Place 1 capsule into inhaler and inhale Daily.     • vitamin B-12 (CYANOCOBALAMIN) 100 MCG tablet Take 50 mcg by mouth Daily.     • vitamin D (ERGOCALCIFEROL) 1.25 MG (16281 UT) capsule capsule Take 50,000 Units by mouth 1 (One) Time Per Week.     • atorvastatin (LIPITOR) 40 MG tablet Take 1 tablet by mouth Every Night. 30 tablet 11   • [DISCONTINUED] GNP Nicotine 21 MG/24HR patch        No current facility-administered medications on file prior to visit.       ALLERGIES    Sulfa antibiotics    Past Medical History:   Diagnosis Date   • Arthritis    • Asthma    • Bronchitis    • COPD (chronic obstructive pulmonary disease) (HCC)    • Fall    • Hyperlipidemia    • Subclavian artery stenosis (HCC)    • Subclavian steal syndrome of left subclavian artery    • Wears dentures    • Wears glasses        Social History     Socioeconomic History   • Marital status:    • Number of children: 3   Tobacco Use   • Smoking status: Current Every Day Smoker     Packs/day: 2.00     Years: 41.00     Pack years: 82.00     Types: Cigarettes   • Smokeless  tobacco: Never Used   Vaping Use   • Vaping Use: Never used   Substance and Sexual Activity   • Alcohol use: Never   • Drug use: Defer   • Sexual activity: Defer       Family History   Problem Relation Age of Onset   • Diabetes Mother    • Heart disease Mother    • Hypertension Mother    • Hyperlipidemia Mother    • Hypertension Father    • Heart disease Father    • Hyperlipidemia Father    • Aneurysm Father        Review of Systems   Constitutional: Negative for activity change, appetite change, chills, fatigue and fever.   HENT: Positive for congestion (Currently having bronchitis is being treated currently with Amoxicillin). Negative for rhinorrhea, sinus pressure, sinus pain and sore throat.    Eyes: Positive for visual disturbance (reading).   Respiratory: Positive for apnea (Oxygen Qnight. ) and cough (Pt states smokers cough). Negative for chest tightness, shortness of breath and wheezing.    Cardiovascular: Negative for chest pain, palpitations and leg swelling.   Gastrointestinal: Negative for abdominal pain, blood in stool, constipation, diarrhea, nausea and vomiting.   Endocrine: Positive for cold intolerance (Pt states has a hard time walking due to arthritis.). Negative for heat intolerance.   Genitourinary: Negative for difficulty urinating, dysuria, frequency, hematuria and urgency.   Musculoskeletal: Positive for arthralgias (Throughout all of body), gait problem (States makes her unstable on feet due to disk in neck, and is see Jc Rod currently) and neck pain (Due to disk issues in neck-Dr. Jc pichardo). Negative for back pain, joint swelling and neck stiffness.   Skin: Negative for rash and wound.   Allergic/Immunologic: Negative for environmental allergies and food allergies.   Neurological: Negative for dizziness, syncope, weakness, light-headedness and headaches.   Hematological: Bruises/bleeds easily (States she does due to plavix).   Psychiatric/Behavioral: Negative for self-injury.  "      Objective   /72 (BP Location: Right arm, Patient Position: Sitting, Cuff Size: Adult)   Pulse 83   Temp 97.3 °F (36.3 °C)   Ht 157.5 cm (62\")   Wt 68.7 kg (151 lb 6.4 oz)   SpO2 96%   BMI 27.69 kg/m²   Vitals:    02/10/22 1533   BP: 133/72   BP Location: Right arm   Patient Position: Sitting   Cuff Size: Adult   Pulse: 83   Temp: 97.3 °F (36.3 °C)   SpO2: 96%   Weight: 68.7 kg (151 lb 6.4 oz)   Height: 157.5 cm (62\")      Lab Results (most recent)     None        Physical Exam  Vitals reviewed.   Constitutional:       General: She is awake.      Appearance: Normal appearance. She is well-developed, well-groomed and overweight.   HENT:      Head: Normocephalic.   Eyes:      General: Lids are normal.      Comments: Wears glasses   Neck:      Vascular: Carotid bruit (BILATERALLY ) present. No hepatojugular reflux or JVD.   Cardiovascular:      Rate and Rhythm: Normal rate and regular rhythm.      Pulses:           Radial pulses are 2+ on the right side and 2+ on the left side.        Dorsalis pedis pulses are 2+ on the right side and 2+ on the left side.        Posterior tibial pulses are 2+ on the right side and 2+ on the left side.      Heart sounds: Normal heart sounds.   Pulmonary:      Effort: Pulmonary effort is normal.      Breath sounds: Normal air entry. Examination of the right-lower field reveals decreased breath sounds. Examination of the left-lower field reveals decreased breath sounds. Decreased breath sounds present.   Abdominal:      General: Bowel sounds are normal.      Palpations: Abdomen is soft.   Musculoskeletal:      Right lower leg: No edema.      Left lower leg: No edema.   Skin:     General: Skin is warm and dry.      Comments: Scar left neck    Neurological:      Mental Status: She is alert and oriented to person, place, and time.   Psychiatric:         Attention and Perception: Attention and perception normal.         Mood and Affect: Mood and affect normal.         Speech: " Speech normal.         Behavior: Behavior normal. Behavior is cooperative.         Thought Content: Thought content normal.         Cognition and Memory: Cognition and memory normal.         Judgment: Judgment normal.         Procedure   Procedures         Assessment/Plan      Diagnosis Plan   1. Subclavian steal syndrome of left subclavian artery  Comprehensive Metabolic Panel    Lipid Panel   2. L Carotid-Subclavian bypass 11/12/21  Comprehensive Metabolic Panel    Lipid Panel   3. Dyslipidemia  Comprehensive Metabolic Panel    Lipid Panel   4. Shortness of breath     5. Peripheral edema     6. Tobacco use     7. History of carotid endarterectomy     8. Grade I diastolic dysfunction         Return in about 6 months (around 8/10/2022).    Subclavian steal syndrome/subclavian artery bypass-patient is on aspirin, Plavix and statin.  Dyslipidemia-patient is on Lipitor.  Shortness of breath-resolved.  Peripheral edema/diastolic dysfunction-no signs of failure.  Tobacco abuse-encourage cessation.  History of common carotid endarterectomy-patient is on aspirin and statin as well as Plavix.  She will get a CMP and lipid today.  She will continue her medication regimen.  She will follow-up in 6 months or sooner if any changes.      Patient brought in medicine bottles to appointment, they have been gone through with the patient. Med list was updated in the chart.     Electronically signed by:         External genitalia is normal.  no discharge

## 2022-02-11 ENCOUNTER — TELEPHONE (OUTPATIENT)
Dept: CARDIOLOGY | Facility: CLINIC | Age: 62
End: 2022-02-11

## 2022-02-11 DIAGNOSIS — M54.2 NECK PAIN: ICD-10-CM

## 2022-02-11 DIAGNOSIS — E78.5 DYSLIPIDEMIA: Primary | ICD-10-CM

## 2022-02-11 DIAGNOSIS — I51.89 GRADE I DIASTOLIC DYSFUNCTION: ICD-10-CM

## 2022-02-11 DIAGNOSIS — G45.8 SUBCLAVIAN STEAL SYNDROME OF LEFT SUBCLAVIAN ARTERY: ICD-10-CM

## 2022-02-11 DIAGNOSIS — Z72.0 TOBACCO USE: ICD-10-CM

## 2022-02-11 DIAGNOSIS — Z98.890 HISTORY OF CAROTID ENDARTERECTOMY: ICD-10-CM

## 2022-02-11 RX ORDER — ATORVASTATIN CALCIUM 80 MG/1
80 TABLET, FILM COATED ORAL DAILY
Qty: 30 TABLET | Refills: 11 | Status: SHIPPED | OUTPATIENT
Start: 2022-02-11

## 2022-02-11 NOTE — TELEPHONE ENCOUNTER
----- Message from SHARYN Shoemaker sent at 2/11/2022  6:42 AM EST -----  Has patient been taking her Lipitor? Her LDL is still 139. If she has been taking her Lipitor every day then we need to increase her to 80 mg and repeat a CMP and lipid in 6 weeks.

## 2022-02-11 NOTE — TELEPHONE ENCOUNTER
Left detailed mess for pt that we are sending in Lipitor 80 mg due to her LDL being elevated . Pt will need to repeat labs in 6 weeks if she wants to go to another  Clinic to get labs she is to call us with where she wants to go otherwise she was advised that we could do labs any day except Friday and that she would not need apt. If she has questions she was advised to return my call       LDL Cholesterol    0 - 100 mg/dL 139 High       Glucose   65 - 99 mg/dL 108 High      All other labs ( WNL)     AZUCENA Jones     Mess sent to [facility] has well   Medication sent and labs ordered

## 2022-02-14 NOTE — TELEPHONE ENCOUNTER
Pt was notified about her lab results and medication increase and that she will need to repeat labs in 6 weeks after starting her increase dose of medication , she was advised that we had already sent her meds into [facility] last week      Pt gave verbal understanding and will repeat labs here she was advised that we could not do any labs on Fridays but she could come in has early has 7:30am Monday - Thursday and she does not have to have apt       AZUCENA Jones

## 2023-02-16 RX ORDER — ATORVASTATIN CALCIUM 80 MG/1
TABLET, FILM COATED ORAL
Qty: 30 TABLET | Refills: 10 | OUTPATIENT
Start: 2023-02-16

## (undated) DEVICE — 3M™ IOBAN™ 2 ANTIMICROBIAL INCISE DRAPE 6650EZ: Brand: IOBAN™ 2

## (undated) DEVICE — SYR CONTRL LUERLOK 10CC

## (undated) DEVICE — ANTIBACTERIAL UNDYED BRAIDED (POLYGLACTIN 910), SYNTHETIC ABSORBABLE SUTURE: Brand: COATED VICRYL

## (undated) DEVICE — DRN PENRS SIL 1/4X18IN LF STRL

## (undated) DEVICE — SYR TB SLPTP 1CC NO NDL

## (undated) DEVICE — ELECTRD BLD EZ CLN STD 2.5IN

## (undated) DEVICE — CATHETER,URETHRAL,REDRUBBER,STRL,18FR: Brand: MEDLINE

## (undated) DEVICE — HDRST POSTIN FM CRDL TRACH SLOT 9X8X4IN

## (undated) DEVICE — SUT SILK 2/0 TIES 18IN A185H

## (undated) DEVICE — PK VASC 10

## (undated) DEVICE — Device

## (undated) DEVICE — DECANTER BAG 9": Brand: MEDLINE INDUSTRIES, INC.

## (undated) DEVICE — SUT PROLN 7/0 BV1 D/A 24IN 8702H

## (undated) DEVICE — SUT SILK 3/0 TIES 18IN A184H

## (undated) DEVICE — GLV SURG BIOGEL LTX PF 7 1/2

## (undated) DEVICE — SUT SILK 4/0 TIES 18IN A183H

## (undated) DEVICE — PATIENT RETURN ELECTRODE, SINGLE-USE, CONTACT QUALITY MONITORING, ADULT, WITH 9FT CORD, FOR PATIENTS WEIGING OVER 33LBS. (15KG): Brand: MEGADYNE

## (undated) DEVICE — SUT PROLN 6/0 C1 D/A 30IN 8706H

## (undated) DEVICE — GLV SURG BIOGEL LTX PF 8

## (undated) DEVICE — SUT PROLN 6/0 C1 CARDIO 18IN 8718H

## (undated) DEVICE — PENCL ROCKRSWCH MEGADYNE W/HOLSTR 10FT SS

## (undated) DEVICE — NDL HYPO ECLPS SFTY 27G 1/2IN

## (undated) DEVICE — SUT PROLN SURGILENE 5.0 24IN BLU 9702H

## (undated) DEVICE — ADHS SKIN PREMIERPRO EXOFIN TOPICAL HI/VISC .5ML

## (undated) DEVICE — STERILE PVP: Brand: MEDLINE INDUSTRIES, INC.

## (undated) DEVICE — NDL HYPO ECLPS SFTY 22G 1 1/2IN